# Patient Record
Sex: FEMALE | Race: WHITE | NOT HISPANIC OR LATINO | Employment: FULL TIME | URBAN - METROPOLITAN AREA
[De-identification: names, ages, dates, MRNs, and addresses within clinical notes are randomized per-mention and may not be internally consistent; named-entity substitution may affect disease eponyms.]

---
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
## 2019-01-10 NOTE — PROGRESS NOTES
"   SUBJECTIVE:   CC: Mariah Arboleda is an 18 year old woman who presents for preventive health visit.     Would like to renew her ocp    Physical   Annual:     Getting at least 3 servings of Calcium per day:  Yes    Bi-annual eye exam:  Yes    Dental care twice a year:  Yes    Sleep apnea or symptoms of sleep apnea:  None    Diet:  Regular (no restrictions)    Frequency of exercise:  2-3 days/week    Duration of exercise:  15-30 minutes    Taking medications regularly:  Yes    Medication side effects:  Not applicable    Additional concerns today:  Yes    PHQ-2 Total Score: 1    Any CONCERNS:   # history of cramping, heavy periods -> really happy on ocps  - moods have improved  - things have been really good on the ocps    # mental health  - going \"pretty good\"  - end of the semester was stressful with finals and trying to figure out housing    # exercise  - started going to the gym on campus - elliptical, weights.    Today's PHQ-2 Score:   PHQ-2 ( 1999 [employer]) 1/16/2019   Q1: Little interest or pleasure in doing things 1   Q2: Feeling down, depressed or hopeless 0   PHQ-2 Score 1   Q1: Little interest or pleasure in doing things Several days   Q2: Feeling down, depressed or hopeless Not at all   PHQ-2 Score 1     Abuse: Current or Past(Physical, Sexual or Emotional)- No  Do you feel safe in your environment? Yes    Social History     Tobacco Use     Smoking status: Never Smoker     Smokeless tobacco: Never Used   Substance Use Topics     Alcohol use: No     Alcohol Use 1/16/2019   If you drink alcohol do you typically have greater than 3 drinks per day OR greater than 7 drinks per week? Not Applicable     Reviewed orders with patient.  Reviewed health maintenance and updated orders accordingly - Yes  Patient Active Problem List   Diagnosis     Overweight (BMI 25.0-29.9)     Anxiety     Past Surgical History:   Procedure Laterality Date     HC TOOTH EXTRACTION W/FORCEP       PE TUBES         Social History "     Tobacco Use     Smoking status: Never Smoker     Smokeless tobacco: Never Used   Substance Use Topics     Alcohol use: No     Family History   Problem Relation Age of Onset     Melanoma Mother      Cancer Father         testicular      Lung Cancer Paternal Grandmother      Leukemia Maternal Aunt         in her 40s     Diabetes Maternal Aunt      Cancer Paternal Aunt         mouth      Leukemia Paternal Aunt         College age     Heart Disease Paternal Grandfather      Macular Degeneration Paternal Grandfather      Glaucoma Maternal Grandmother      Brain Tumor Maternal Grandfather      Breast Cancer No family hx of      Colon Cancer No family hx of      Retinal detachment No family hx of          Current Outpatient Medications   Medication Sig Dispense Refill     norgestim-eth estrad triphasic (ORTHO TRI-CYCLEN LO) 0.18/0.215/0.25 MG-25 MCG tablet Take 1 tablet by mouth daily 84 tablet 3     Allergies   Allergen Reactions     Nystatin Nausea and Vomiting       Mammogram not appropriate for this patient based on age.    Pertinent mammograms are reviewed under the imaging tab.  History of abnormal Pap smear: NO - under age 21, PAP not appropriate for age     Reviewed and updated as needed this visit by clinical staff  Tobacco  Allergies  Meds  Med Hx  Surg Hx  Fam Hx  Soc Hx        Reviewed and updated as needed this visit by Provider  Med Hx  Surg Hx  Fam Hx        Past Medical History:   Diagnosis Date     Pertussis 2016      Past Surgical History:   Procedure Laterality Date     HC TOOTH EXTRACTION W/FORCEP       PE TUBES         Review of Systems   Constitutional: Negative for chills and fever.   HENT: Negative for congestion, ear pain, hearing loss and sore throat.    Eyes: Negative for pain and visual disturbance.   Respiratory: Negative for cough and shortness of breath.    Cardiovascular: Negative for chest pain, palpitations and peripheral edema.   Gastrointestinal: Negative for abdominal pain,  "constipation, diarrhea, heartburn, hematochezia and nausea.   Breasts:  Negative for tenderness, breast mass and discharge.   Genitourinary: Negative for dysuria, frequency, genital sores, hematuria, pelvic pain, urgency, vaginal bleeding and vaginal discharge.   Musculoskeletal: Negative for arthralgias, joint swelling and myalgias.   Skin: Negative for rash.   Neurological: Negative for dizziness, weakness, headaches and paresthesias.   Psychiatric/Behavioral: Negative for mood changes. The patient is nervous/anxious.         OBJECTIVE:   /68   Pulse 77   Temp 98.1  F (36.7  C) (Tympanic)   Ht 1.676 m (5' 6\")   Wt 82.9 kg (182 lb 11.2 oz)   SpO2 98%   BMI 29.49 kg/m    Physical Exam  GENERAL: healthy, alert and no distress  EYES: Eyes grossly normal to inspection, PERRL and conjunctivae and sclerae normal  HENT: ear canals and TM's normal, nose and mouth without ulcers or lesions  NECK: no adenopathy, no asymmetry, masses, or scars and thyroid normal to palpation  RESP: lungs clear to auscultation - no rales, rhonchi or wheezes  CV: regular rate and rhythm, normal S1 S2, no S3 or S4, no murmur, click or rub, no peripheral edema and peripheral pulses strong  ABDOMEN: soft, nontender, no hepatosplenomegaly, no masses and bowel sounds normal  MS: no gross musculoskeletal defects noted, no edema  SKIN: no suspicious lesions or rashes  NEURO: Normal strength and tone, mentation intact and speech normal  PSYCH: mentation appears normal, affect normal/bright    Diagnostic Test Results:  none     ASSESSMENT/PLAN:       ICD-10-CM    1. Routine general medical examination at a health care facility Z00.00    2. Overweight (BMI 25.0-29.9) E66.3    3. Encounter for surveillance of contraceptive pills Z30.41 norgestim-eth estrad triphasic (ORTHO TRI-CYCLEN LO) 0.18/0.215/0.25 MG-25 MCG tablet       COUNSELING:  Reviewed preventive health counseling, as reflected in patient instructions       Regular exercise       " "Healthy diet/nutrition       Contraception       Safe sex practices/STD prevention    BP Readings from Last 1 Encounters:   01/16/19 122/68 (82 %/ 58 %)*     *BP percentiles are based on the August 2017 AAP Clinical Practice Guideline for girls     Estimated body mass index is 29.49 kg/m  as calculated from the following:    Height as of this encounter: 1.676 m (5' 6\").    Weight as of this encounter: 82.9 kg (182 lb 11.2 oz).    BP Screening:   Last 3 BP Readings:    BP Readings from Last 3 Encounters:   01/16/19 122/68 (82 %/ 58 %)*   08/06/18 118/68 (72 %/ 57 %)*   01/07/16 112/70 (58 %/ 63 %)*     *BP percentiles are based on the August 2017 AAP Clinical Practice Guideline for girls     The following was recommended to the patient:  Re-screen BP within a year and recommended lifestyle modifications  Weight management plan: Discussed healthy diet and exercise guidelines     reports that  has never smoked. she has never used smokeless tobacco.    Counseling Resources:  ATP IV Guidelines  Pooled Cohorts Equation Calculator  Breast Cancer Risk Calculator  FRAX Risk Assessment  ICSI Preventive Guidelines  Dietary Guidelines for Americans, 2010  [employer] MyPlate  ASA Prophylaxis  Lung CA Screening    Patricio Copeland MD  [facility]  "

## 2019-01-10 NOTE — PATIENT INSTRUCTIONS
It was nice to see you in clinic.    I'm so glad you talked about mental health. I do recommend looking into therapy at school - let me know if things aren't going well.     Schedule exercise 3-4 times a week.   I love the yoga class.    The [facility] Crisis Response Unit (CRU) provides 24-hour phone and face-to-face crisis intervention and consultation. Call [phone].   Other crisis resources:   Crisis Connection (24-hour hotline for mental health): 983.272.8164   [facility] Hotline for Youth Crisis and Suicide: 5-620-XSIOZPO (1-325.409.9820)    Preventive Health Recommendations  Female Ages 18 to 20     Yearly exam:     See your health care provider every year in order to  o Review health changes.   o Discuss preventive care.    o Review your medicines if your doctor has prescribed any.      You should be tested each year for STDs (sexually transmitted diseases).       After age 20, talk to your provider about how often you should have cholesterol testing.      If you are at risk for diabetes, you should have a diabetes test (fasting glucose).     Shots:     Get a flu shot each year.     Get a tetanus shot every 10 years.     Consider getting the shot (vaccine) that prevents cervical cancer (Gardasil).    Nutrition:     Eat at least 5 servings of fruits and vegetables each day.    Eat whole-grain bread, whole-wheat pasta and brown rice instead of white grains and rice.    Get adequate Calcium and Vitamin D.     Lifestyle    Exercise at least 150 minutes a week each week (30 minutes a day, 5 days a week). This will help you control your weight and prevent disease.    No smoking.     Wear sunscreen to prevent skin cancer.    See your dentist every six months for an exam and cleaning.

## 2019-01-16 ENCOUNTER — OFFICE VISIT (OUTPATIENT)
Dept: PEDIATRICS | Facility: CLINIC | Age: 19
End: 2019-01-16
Payer: COMMERCIAL

## 2019-01-16 VITALS
BODY MASS INDEX: 29.36 KG/M2 | DIASTOLIC BLOOD PRESSURE: 68 MMHG | TEMPERATURE: 98.1 F | OXYGEN SATURATION: 98 % | WEIGHT: 182.7 LBS | HEIGHT: 66 IN | SYSTOLIC BLOOD PRESSURE: 122 MMHG | HEART RATE: 77 BPM

## 2019-01-16 DIAGNOSIS — E66.3 OVERWEIGHT (BMI 25.0-29.9): ICD-10-CM

## 2019-01-16 DIAGNOSIS — Z30.41 ENCOUNTER FOR SURVEILLANCE OF CONTRACEPTIVE PILLS: ICD-10-CM

## 2019-01-16 DIAGNOSIS — Z00.00 ROUTINE GENERAL MEDICAL EXAMINATION AT A HEALTH CARE FACILITY: Primary | ICD-10-CM

## 2019-01-16 PROCEDURE — 99395 PREV VISIT EST AGE 18-39: CPT | Performed by: INTERNAL MEDICINE

## 2019-01-16 RX ORDER — NORGESTIMATE AND ETHINYL ESTRADIOL 7DAYSX3 LO
1 KIT ORAL DAILY
Qty: 84 TABLET | Refills: 3 | Status: SHIPPED | OUTPATIENT
Start: 2019-01-16 | End: 2019-10-01

## 2019-01-16 ASSESSMENT — ENCOUNTER SYMPTOMS
EYE PAIN: 0
FREQUENCY: 0
NAUSEA: 0
WEAKNESS: 0
PALPITATIONS: 0
CHILLS: 0
SHORTNESS OF BREATH: 0
COUGH: 0
BREAST MASS: 0
FEVER: 0
SORE THROAT: 0
MYALGIAS: 0
CONSTIPATION: 0
JOINT SWELLING: 0
DIZZINESS: 0
HEMATOCHEZIA: 0
HEARTBURN: 0
HEMATURIA: 0
NERVOUS/ANXIOUS: 1
DIARRHEA: 0
PARESTHESIAS: 0
DYSURIA: 0
ARTHRALGIAS: 0
HEADACHES: 0
ABDOMINAL PAIN: 0

## 2019-01-16 ASSESSMENT — MIFFLIN-ST. JEOR: SCORE: 1620.47

## 2019-09-30 ENCOUNTER — MYC MEDICAL ADVICE (OUTPATIENT)
Dept: PEDIATRICS | Facility: CLINIC | Age: 19
End: 2019-09-30

## 2019-09-30 DIAGNOSIS — Z30.41 ENCOUNTER FOR SURVEILLANCE OF CONTRACEPTIVE PILLS: ICD-10-CM

## 2019-10-01 RX ORDER — NORGESTIMATE AND ETHINYL ESTRADIOL 7DAYSX3 LO
1 KIT ORAL DAILY
Qty: 84 TABLET | Refills: 0 | Status: SHIPPED | OUTPATIENT
Start: 2019-10-01 | End: 2019-12-09

## 2019-11-06 ENCOUNTER — HEALTH MAINTENANCE LETTER (OUTPATIENT)
Age: 19
End: 2019-11-06

## 2019-11-25 ENCOUNTER — MYC MEDICAL ADVICE (OUTPATIENT)
Dept: PEDIATRICS | Facility: CLINIC | Age: 19
End: 2019-11-25

## 2019-11-25 ENCOUNTER — TELEPHONE (OUTPATIENT)
Dept: PEDIATRICS | Facility: CLINIC | Age: 19
End: 2019-11-25

## 2019-11-25 NOTE — TELEPHONE ENCOUNTER
Reason for call:  Other   Patient called regarding (reason for call): Patient has concerns/questions  Additional comments: Patient's mom called in trying to help Mariah as she is busy with her school schedule. Airam (mom) states Mariah sent a message through View3 earlier today asking for a phone call and no one called. I advised Airam that the message was sent directly to Dr Felicitas Ríos who is out on leave, so she may not see it, however I will send a message to team asking for someone to reach out to the patient via View3 if possible to help answer her questions.  Also mom and daughter will fill out consent to comm and auth Wirescan access for future.  Please communicate with Mariah via Wirescan, or if unable, please call her to discuss and advise.   Thanks.     Phone number to reach patient:  Cell number on file:    Telephone Information:   Mobile [phone]       Best Time:  Any    Can we leave a detailed message on this number?  YES

## 2019-11-26 NOTE — TELEPHONE ENCOUNTER
I called patient left detailed message,  Per Claudia Plascencia to use [facility]  For January 17th  Physical + Birth control insertion.     Requested for patient to return call to station B.        //Tejal Marcano MA// November 26, 2019 9:28 AM

## 2019-11-26 NOTE — TELEPHONE ENCOUNTER
The pt called back and she still wants to keep her physical with her pcp on 3/9/2020. She just wants to see Marylin for bc insertion.   Armida May on 11/26/2019 at 10:29 AM

## 2020-01-17 ENCOUNTER — OFFICE VISIT (OUTPATIENT)
Dept: PEDIATRICS | Facility: CLINIC | Age: 20
End: 2020-01-17
Payer: COMMERCIAL

## 2020-01-17 VITALS
HEIGHT: 66 IN | TEMPERATURE: 98.7 F | RESPIRATION RATE: 16 BRPM | SYSTOLIC BLOOD PRESSURE: 140 MMHG | WEIGHT: 193 LBS | HEART RATE: 109 BPM | DIASTOLIC BLOOD PRESSURE: 78 MMHG | BODY MASS INDEX: 31.02 KG/M2 | OXYGEN SATURATION: 98 %

## 2020-01-17 DIAGNOSIS — Z30.430 ENCOUNTER FOR IUD INSERTION: Primary | ICD-10-CM

## 2020-01-17 DIAGNOSIS — Z30.09 ENCOUNTER FOR OTHER GENERAL COUNSELING OR ADVICE ON CONTRACEPTION: ICD-10-CM

## 2020-01-17 LAB — HCG UR QL: NEGATIVE

## 2020-01-17 PROCEDURE — 87591 N.GONORRHOEAE DNA AMP PROB: CPT | Performed by: NURSE PRACTITIONER

## 2020-01-17 PROCEDURE — 99203 OFFICE O/P NEW LOW 30 MIN: CPT | Mod: 25 | Performed by: NURSE PRACTITIONER

## 2020-01-17 PROCEDURE — 58300 INSERT INTRAUTERINE DEVICE: CPT | Performed by: NURSE PRACTITIONER

## 2020-01-17 PROCEDURE — 81025 URINE PREGNANCY TEST: CPT | Performed by: NURSE PRACTITIONER

## 2020-01-17 PROCEDURE — 87491 CHLMYD TRACH DNA AMP PROBE: CPT | Performed by: NURSE PRACTITIONER

## 2020-01-17 ASSESSMENT — MIFFLIN-ST. JEOR: SCORE: 1670.06

## 2020-01-17 NOTE — PROGRESS NOTES
Chief Complaint/ History of Present Illness:Mariah Arboleda is a 19 year old female.   Patient's last menstrual period was 01/09/2020 (exact date)..    She was here initially to have a nexplanon placed, but she was unsure of her option. She has been on birth contorl pills. She is sexually active.     A pregnancy test was done today.  She is currently using OCP for birth control.   A chlamydia/gonorrhea test was was done today because of never had it before  She is here for an IUD insertion.  Patient has been given written information.  I have reviewed the risks of the IUD including pregnancy, PID, life threatening infection, perforation, expulsion, cramping, changes in bleeding and ovarian cysts. Benefits of the IUD and alternative family planning methods have been discussed.  Patients questions are answered.  Patient has verbalized understanding of risks and benefits and has signed the consent form.    Allergies   Allergen Reactions     Nystatin Nausea and Vomiting     Current Outpatient Medications   Medication Sig Dispense Refill     norgestim-eth estrad triphasic (ORTHO TRI-CYCLEN LO) 0.18/0.215/0.25 MG-25 MCG tablet Take 1 tablet by mouth daily 84 tablet 0      Past Medical History:   Diagnosis Date     Pertussis 2016     Past Surgical History:   Procedure Laterality Date     HC TOOTH EXTRACTION W/FORCEP       PE TUBES         REVIEW OF SYSTEMS  CONSTITUTIONAL: Denies fever, chills and night sweats  BREASTS:  Denies discharge and lumps.    HEART/LUNGS: Denies dyspnea, wheezing, coughing and chest pain.  HEMATOLOGIC: Denies tendency to bruise and history of blood clots.  GENITOURINARY:  Denies urgency, dysuria and hematuria.  NEUROLOGIC:  Denies seizures, weakness and fainting.  PSYCHIATRIC: Negative for changes in mood or affect      EXAM:  BP (!) 140/78 (BP Location: Right arm, Patient Position: Sitting, Cuff Size: Adult Regular)   Pulse 109   Temp 98.7  F (37.1  C) (Tympanic)   Resp 16   Ht 1.689 m (5'  "6.5\")   Wt 87.5 kg (193 lb)   LMP 01/09/2020 (Exact Date)   SpO2 98%   Breastfeeding No   BMI 30.69 kg/m      Abdomen: soft, nontender, without hepatosplenomegaly or masses  : normal cervix, adnexae, and uterus without masses or discharge  IUD type: Mirena  Lot # [id number] Exp: 04/2022    Procedure:  Uterus assessed for position and is Anteverted.  Speculum inserted.  Betadine prep of cervix done.  Tenaculum used. Uterus sounded to 9cm's.  IUD inserted in the usual fashion without problems, ie: resistance, severe protracted pain or excessive bleeding.  Strings trimmed to 2 cm's.  Patient tolerated the procedure  well without any prolonged pain or syncopy.      ASSESSMENT / PLAN:  (Z30.430) Encounter for IUD insertion  (primary encounter diagnosis)  Comment:   Plan:     (Z30.09) Encounter for other general counseling or advice on contraception  Comment: we had a veyr lenghty conversation about birth control options including OCP, nuvaring, depo, nexplanon, IUD. We discussed the benefits and risks as well as side effects extensively. She was going to do nexplanon, and at last minute changed her mind to IUD. We spent additional time ensuring she felt comfortable with this choice. Of note she is going back to college tomorrow and would like to intiiate the method today. She did sign consent and reviewed placement.   Plan:   30 minutes spent in face-to-face time with patient and >50% of time in counseling and education about contraception, above and beyond preventative care.           Patient Instructions   You may experience a period or spotting that will last over the next week. This usually starts to lighten after a few days. It takes about a month for this spotting is usually gone, unless you are one of the few that experience spotting that can last up to 3-6 months. You may experience cramping for the remainder of the day and continue to take ibuprofen 2-3 tabs every six hours as needed. This will likely " subside over the next day.      Please contact me by mychart of phone if you should have any unusual symptoms or concerns in the mean time.                 Instructions given to patient regarding checking IUD strings, returning to the clinic if pain or inability to check strings and/or irregular bleeding.

## 2020-01-17 NOTE — PATIENT INSTRUCTIONS
You may experience a period or spotting that will last over the next week. This usually starts to lighten after a few days. It takes about a month for this spotting is usually gone, unless you are one of the few that experience spotting that can last up to 3-6 months. You may experience cramping for the remainder of the day and continue to take ibuprofen 2-3 tabs every six hours as needed. This will likely subside over the next day.      Please contact me by mychart of phone if you should have any unusual symptoms or concerns in the mean time.

## 2020-01-19 LAB
C TRACH DNA SPEC QL NAA+PROBE: NEGATIVE
N GONORRHOEA DNA SPEC QL NAA+PROBE: NEGATIVE
SPECIMEN SOURCE: NORMAL
SPECIMEN SOURCE: NORMAL

## 2020-02-15 NOTE — PROGRESS NOTES
SUBJECTIVE:   Mariah Arboleda is a 17 year old female who presents to clinic today for the following health issues:    New Patient/Transfer of Care  Patient is here for consultation of birth control    -----------  Menarche in 5th grade - 9 or 10  Regular  Some are heavy - on heaviest day uses 3-4 tampons/pads  Gets a lot of cramping - first 3 days are really bad - takes pamprin (similar to midol) - heating pad... Doesn't miss school  No fh of bleeding disorders    ? Pat g'pa blood clots  No immediate family history of DVT/PE.  No history of migraine or migraine with aura.    Would like to start birth control to decrease cramps and PMS symptoms.    Never sexually active. Not planning on sexual activity.    Previously followed by Pediatrician - Mom thinks she may be due for vaccinations but not sure. Has been some time since her last visit.     With Mom out of the room Mariah has no concerns - not planning on sex. Never sexually active.     Problem list and histories reviewed & adjusted, as indicated.  Additional history: as documented    There is no problem list on file for this patient.    Past Surgical History:   Procedure Laterality Date     PE TUBES         Social History   Substance Use Topics     Smoking status: Never Smoker     Smokeless tobacco: Never Used     Alcohol use No     Family History   Problem Relation Age of Onset     Melanoma Mother      Cancer Father      testicular      Lung Cancer Paternal Grandmother      Leukemia Maternal Aunt      in her 40s     Diabetes Maternal Aunt      Cancer Paternal Aunt      mouth      Leukemia Paternal Aunt      College age     HEART DISEASE Paternal Grandfather      Breast Cancer No family hx of      Colon Cancer No family hx of          Current Outpatient Prescriptions   Medication Sig Dispense Refill     norgestim-eth estrad triphasic (ORTHO TRI-CYCLEN LO) 0.18/0.215/0.25 MG-25 MCG per tablet Take 1 tablet by mouth daily 84 tablet 1     Allergies   Allergen  "Reactions     Nystatin Nausea and Vomiting       Reviewed and updated as needed this visit by clinical staff       Reviewed and updated as needed this visit by Provider         ROS:  Constitutional, HEENT, cardiovascular, pulmonary, gi and gu systems are negative, except as otherwise noted.    OBJECTIVE:     /68  Pulse 71  Temp 98.3  F (36.8  C) (Oral)  Ht 5' 6.26\" (1.683 m)  Wt 183 lb 9.6 oz (83.3 kg)  LMP 07/18/2018 (Exact Date)  SpO2 98%  Breastfeeding? No  BMI 29.4 kg/m2  Body mass index is 29.4 kg/(m^2).  GENERAL: healthy, alert and no distress  RESP: lungs clear to auscultation - no rales, rhonchi or wheezes  CV: regular rate and rhythm, normal S1 S2, no S3 or S4, no murmur, click or rub, no peripheral edema and peripheral pulses strong  ABDOMEN: soft, nontender, no hepatosplenomegaly, no masses and bowel sounds normal  MS: no gross musculoskeletal defects noted, no edema  SKIN: no suspicious lesions or rashes  NEURO: Normal strength and tone, mentation intact and speech normal  PSYCH: mentation appears normal, affect normal/bright    Diagnostic Test Results:  none     ASSESSMENT/PLAN:       ICD-10-CM    1. Encounter for initial prescription of contraceptive pills Z30.011 norgestim-eth estrad triphasic (ORTHO TRI-CYCLEN LO) 0.18/0.215/0.25 MG-25 MCG per tablet   2. Moderate cramps with menses N94.6 norgestim-eth estrad triphasic (ORTHO TRI-CYCLEN LO) 0.18/0.215/0.25 MG-25 MCG per tablet     Would like to start birth control to regulate cramping and PMS symptoms. Reviewed different options - pt elects OCPs. No contraindications. Non smoker. No personal or FH of DVT/PE, no personal history of migraine with aura.     Never sexually active - no pregnancy test needed.    Discussed at length risk factors of birth control including blood clots and hypertension. Advised to go to ED if ever develops leg swelling or shortness of breath. Side effects including headaches, nausea, and mood changes were " discussed. Reviewed that birth control dose NOT protect again STDs, recommend routine screening with any new partner or symptoms. Discussed that concurrent smoking will further raise risk of blood clots while on contraception.    Will get records from Pediatrician and plan for vaccines prior to college. Return to clinic in December for Wellness and check in on OCPs.     Patient Instructions   Nice to meet you today!    Let's try ortho tri cyclen low. Can have spotting/irregular periods for the first few months.     We'll get records and be in touch about vaccines. Call us if you haven't heard anything in 2 weeks.     Let's plan for a Wellness visit over [place] break - we can also touch base about the birth control.    Any other questions [email or web address] is a great resource.    Combination Pills (COCs)    You can start the combination pill at any time.    First Day Start - Take your first pill during the first 24 hours of your menstrual cycle. No back-up contraceptive method is needed when the pill is started the first day of your menses.    Sunday Start - Wait until the first Sunday after your menstrual cycle begins to take your first pill. With this option use another method of birth control for the first 7 days of the first cycle only. The benefit of this is that you shouldn't have a period on the weekend.    Today Start - Start the pill today. If you have had unprotected sexual intercourse since your last period, perform a pregnancy test prior to starting the pill. If it is negative, start the pill today. Use another method of birth control such as condoms or spermicide the first seven days of the first cycle of use.    Irregular vaginal bleeding or spotting may occur while you are taking the pills, especially during the first few months of oral contraceptive use. If you experience spotting or break-through bleeding, (bleeding that occurs outside of the placebo week) that occurs after the first 3 cycles, or  lasts for more than a few days, please let me know.    Patricio Copeland MD  [facility]   Observation Stay One Calendar Day

## 2020-03-09 ENCOUNTER — OFFICE VISIT (OUTPATIENT)
Dept: PEDIATRICS | Facility: CLINIC | Age: 20
End: 2020-03-09
Payer: COMMERCIAL

## 2020-03-09 VITALS
HEART RATE: 83 BPM | RESPIRATION RATE: 16 BRPM | SYSTOLIC BLOOD PRESSURE: 120 MMHG | OXYGEN SATURATION: 99 % | TEMPERATURE: 98.2 F | BODY MASS INDEX: 31.56 KG/M2 | HEIGHT: 67 IN | WEIGHT: 201.1 LBS | DIASTOLIC BLOOD PRESSURE: 77 MMHG

## 2020-03-09 DIAGNOSIS — Z11.3 SCREEN FOR STD (SEXUALLY TRANSMITTED DISEASE): ICD-10-CM

## 2020-03-09 DIAGNOSIS — Z00.00 ROUTINE GENERAL MEDICAL EXAMINATION AT A HEALTH CARE FACILITY: Primary | ICD-10-CM

## 2020-03-09 PROCEDURE — 99395 PREV VISIT EST AGE 18-39: CPT | Performed by: NURSE PRACTITIONER

## 2020-03-09 PROCEDURE — 80061 LIPID PANEL: CPT | Performed by: NURSE PRACTITIONER

## 2020-03-09 PROCEDURE — 36415 COLL VENOUS BLD VENIPUNCTURE: CPT | Performed by: NURSE PRACTITIONER

## 2020-03-09 PROCEDURE — 87389 HIV-1 AG W/HIV-1&-2 AB AG IA: CPT | Performed by: NURSE PRACTITIONER

## 2020-03-09 ASSESSMENT — ENCOUNTER SYMPTOMS
BREAST MASS: 0
WEAKNESS: 0
COUGH: 0
PARESTHESIAS: 0
ABDOMINAL PAIN: 0
DIZZINESS: 0
NAUSEA: 0
HEMATOCHEZIA: 0
FEVER: 0
PALPITATIONS: 0
ARTHRALGIAS: 0
NERVOUS/ANXIOUS: 1
DIARRHEA: 0
SHORTNESS OF BREATH: 0
MYALGIAS: 0
SORE THROAT: 0
FREQUENCY: 0
HEADACHES: 0
EYE PAIN: 0
DYSURIA: 0
HEMATURIA: 0
CHILLS: 0
HEARTBURN: 0
JOINT SWELLING: 0
CONSTIPATION: 0

## 2020-03-09 ASSESSMENT — MIFFLIN-ST. JEOR: SCORE: 1706.87

## 2020-03-09 ASSESSMENT — ANXIETY QUESTIONNAIRES
IF YOU CHECKED OFF ANY PROBLEMS ON THIS QUESTIONNAIRE, HOW DIFFICULT HAVE THESE PROBLEMS MADE IT FOR YOU TO DO YOUR WORK, TAKE CARE OF THINGS AT HOME, OR GET ALONG WITH OTHER PEOPLE: SOMEWHAT DIFFICULT
GAD7 TOTAL SCORE: 8
3. WORRYING TOO MUCH ABOUT DIFFERENT THINGS: SEVERAL DAYS
5. BEING SO RESTLESS THAT IT IS HARD TO SIT STILL: MORE THAN HALF THE DAYS
7. FEELING AFRAID AS IF SOMETHING AWFUL MIGHT HAPPEN: NOT AT ALL
1. FEELING NERVOUS, ANXIOUS, OR ON EDGE: SEVERAL DAYS
2. NOT BEING ABLE TO STOP OR CONTROL WORRYING: SEVERAL DAYS
6. BECOMING EASILY ANNOYED OR IRRITABLE: MORE THAN HALF THE DAYS

## 2020-03-09 ASSESSMENT — PATIENT HEALTH QUESTIONNAIRE - PHQ9
5. POOR APPETITE OR OVEREATING: SEVERAL DAYS
SUM OF ALL RESPONSES TO PHQ QUESTIONS 1-9: 5

## 2020-03-09 NOTE — PROGRESS NOTES
SUBJECTIVE:   CC: Mariah Arboleda is an 19 year old woman who presents for preventive health visit.     Healthy Habits:     Getting at least 3 servings of Calcium per day:  Yes    Bi-annual eye exam:  Yes    Dental care twice a year:  Yes    Sleep apnea or symptoms of sleep apnea:  None    Diet:  Regular (no restrictions)    Frequency of exercise:  4-5 days/week    Duration of exercise:  45-60 minutes    Taking medications regularly:  Not Applicable    Barriers to taking medications:  Not applicable    Medication side effects:  Not applicable    PHQ-2 Total Score: 1    Additional concerns today:  No    Has IUD in place, can now feel strings. Had spotting first month, then had menses, then hasn't bled since. No new partners sincae last sti testing.     Today's PHQ-2 Score:   PHQ-2 ( 1999 Pfizer) 3/9/2020   Q1: Little interest or pleasure in doing things 0   Q2: Feeling down, depressed or hopeless 1   PHQ-2 Score 1   Q1: Little interest or pleasure in doing things Not at all   Q2: Feeling down, depressed or hopeless Several days   PHQ-2 Score 1     Started exercising more, cardio with eliptical, weight lifting for 5 days per wk.    Wt Readings from Last 4 Encounters:   03/09/20 91.2 kg (201 lb 1.6 oz)   01/17/20 87.5 kg (193 lb)   01/16/19 82.9 kg (182 lb 11.2 oz) (95 %)*   08/06/18 83.3 kg (183 lb 9.6 oz) (96 %)*     * Growth percentiles are based on CDC (Girls, 2-20 Years) data.       Abuse: Current or Past(Physical, Sexual or Emotional)- No  Do you feel safe in your environment? Yes        Social History     Tobacco Use     Smoking status: Never Smoker     Smokeless tobacco: Never Used   Substance Use Topics     Alcohol use: No         Alcohol Use 3/9/2020   Prescreen: >3 drinks/day or >7 drinks/week? No       Reviewed orders with patient.  Reviewed health maintenance and updated orders accordingly - Yes  Lab work is in process    Mammogram not appropriate for this patient based on age.    Pertinent mammograms  "are reviewed under the imaging tab.  History of abnormal Pap smear: NO - under age 21, PAP not appropriate for age     Reviewed and updated as needed this visit by clinical staff  Tobacco  Allergies  Meds  Med Hx  Surg Hx  Fam Hx  Soc Hx        Reviewed and updated as needed this visit by Provider  Meds            Review of Systems   Constitutional: Negative for chills and fever.   HENT: Negative for congestion, ear pain, hearing loss and sore throat.    Eyes: Negative for pain and visual disturbance.   Respiratory: Negative for cough and shortness of breath.    Cardiovascular: Negative for chest pain, palpitations and peripheral edema.   Gastrointestinal: Negative for abdominal pain, constipation, diarrhea, heartburn, hematochezia and nausea.   Breasts:  Negative for tenderness, breast mass and discharge.   Genitourinary: Negative for dysuria, frequency, genital sores, hematuria, pelvic pain, urgency, vaginal bleeding and vaginal discharge.   Musculoskeletal: Negative for arthralgias, joint swelling and myalgias.   Skin: Negative for rash.   Neurological: Negative for dizziness, weakness, headaches and paresthesias.   Psychiatric/Behavioral: Positive for mood changes. The patient is nervous/anxious.           OBJECTIVE:   /77   Pulse 83   Temp 98.2  F (36.8  C) (Oral)   Resp 16   Ht 1.689 m (5' 6.5\")   Wt 91.2 kg (201 lb 1.6 oz)   SpO2 99%   BMI 31.97 kg/m    Physical Exam  GENERAL: healthy, alert and no distress  EYES: Eyes grossly normal to inspection, PERRL and conjunctivae and sclerae normal  HENT: ear canals and TM's normal, nose and mouth without ulcers or lesions  NECK: no adenopathy, no asymmetry, masses, or scars and thyroid normal to palpation  RESP: lungs clear to auscultation - no rales, rhonchi or wheezes  CV: regular rate and rhythm, normal S1 S2, no S3 or S4, no murmur, click or rub, no peripheral edema and peripheral pulses strong  MS: no gross musculoskeletal defects noted, no " "edema  SKIN: no suspicious lesions or rashes  PSYCH: mentation appears normal, affect normal/bright      ASSESSMENT/PLAN:   1. Routine general medical examination at a health care facility  She couldn't find strings, bu then found them and has had no probvlem and likes her IUD.   - Lipid Profile    2. Screen for STD (sexually transmitted disease)    - HIV Antigen Antibody Combo    COUNSELING:  Reviewed preventive health counseling, as reflected in patient instructions  Special attention given to:        Regular exercise       Healthy diet/nutrition       Contraception       Family planning       Folic Acid Counseling       Osteoporosis Prevention/Bone Health       Safe sex practices/STD prevention    Estimated body mass index is 31.97 kg/m  as calculated from the following:    Height as of this encounter: 1.689 m (5' 6.5\").    Weight as of this encounter: 91.2 kg (201 lb 1.6 oz).         reports that she has never smoked. She has never used smokeless tobacco.      Counseling Resources:  ATP IV Guidelines  Pooled Cohorts Equation Calculator  Breast Cancer Risk Calculator  FRAX Risk Assessment  ICSI Preventive Guidelines  Dietary Guidelines for Americans, 2010  USDA's MyPlate  ASA Prophylaxis  Lung CA Screening    DELLA Armenadriz [facility]  "

## 2020-03-10 LAB
CHOLEST SERPL-MCNC: 190 MG/DL
HDLC SERPL-MCNC: 53 MG/DL
HIV 1+2 AB+HIV1 P24 AG SERPL QL IA: NONREACTIVE
LDLC SERPL CALC-MCNC: 124 MG/DL
NONHDLC SERPL-MCNC: 137 MG/DL
TRIGL SERPL-MCNC: 65 MG/DL

## 2020-03-10 ASSESSMENT — ANXIETY QUESTIONNAIRES: GAD7 TOTAL SCORE: 8

## 2020-11-29 ENCOUNTER — HEALTH MAINTENANCE LETTER (OUTPATIENT)
Age: 20
End: 2020-11-29

## 2021-01-04 ENCOUNTER — VIRTUAL VISIT (OUTPATIENT)
Dept: PEDIATRICS | Facility: CLINIC | Age: 21
End: 2021-01-04
Payer: COMMERCIAL

## 2021-01-04 DIAGNOSIS — F41.9 ANXIETY: Primary | ICD-10-CM

## 2021-01-04 DIAGNOSIS — F32.0 MILD MAJOR DEPRESSION (H): ICD-10-CM

## 2021-01-04 PROCEDURE — 99213 OFFICE O/P EST LOW 20 MIN: CPT | Mod: 95 | Performed by: PEDIATRICS

## 2021-01-04 PROCEDURE — 96127 BRIEF EMOTIONAL/BEHAV ASSMT: CPT | Performed by: PEDIATRICS

## 2021-01-04 RX ORDER — MULTIVIT-MIN/IRON/FOLIC ACID/K 18-600-40
1000 CAPSULE ORAL EVERY OTHER DAY
COMMUNITY

## 2021-01-04 RX ORDER — ESCITALOPRAM OXALATE 10 MG/1
TABLET ORAL
COMMUNITY
Start: 2020-11-25 | End: 2021-04-05

## 2021-01-04 ASSESSMENT — ANXIETY QUESTIONNAIRES
2. NOT BEING ABLE TO STOP OR CONTROL WORRYING: NEARLY EVERY DAY
7. FEELING AFRAID AS IF SOMETHING AWFUL MIGHT HAPPEN: SEVERAL DAYS
7. FEELING AFRAID AS IF SOMETHING AWFUL MIGHT HAPPEN: SEVERAL DAYS
6. BECOMING EASILY ANNOYED OR IRRITABLE: MORE THAN HALF THE DAYS
4. TROUBLE RELAXING: NEARLY EVERY DAY
1. FEELING NERVOUS, ANXIOUS, OR ON EDGE: NEARLY EVERY DAY
GAD7 TOTAL SCORE: 17
3. WORRYING TOO MUCH ABOUT DIFFERENT THINGS: NEARLY EVERY DAY
GAD7 TOTAL SCORE: 17
5. BEING SO RESTLESS THAT IT IS HARD TO SIT STILL: MORE THAN HALF THE DAYS

## 2021-01-04 NOTE — PATIENT INSTRUCTIONS
Demetrio Lemus,    I'm glad your eating is back on track and you have found a good psychiatrist. At this point I do not believe you need a further workup for an eating disorder.    We will plan to check your vitamin D and thyroid at your upcoming visit.    Take care,    Marina Sotelo MD  Internal Medicine/Pediatrics  [facility]

## 2021-01-04 NOTE — Clinical Note
FYI - patient has physical with you later this spring - we discussed checking vit D and TSH at this time due to mental health - she is following with psychiatry outside our system. Marina BENNETT

## 2021-01-04 NOTE — PROGRESS NOTES
Mariah Arboleda is a 20 year old female who is being evaluated via a billable video visit.      How would you like to obtain your AVS? MyChart  If the video visit is dropped, the invitation should be resent by: Send to e-mail at: [email or web address]  Will anyone else be joining your video visit? No      Video Start Time: 10:09am  Assessment & Plan     Anxiety  Following with psychatiry - doing weekly therapy and just started lexapro 10mg four weeks ago - has appropriate follow up with psych.    Regarding restrictive eating x 1 week - this patient dose not have any history of eating disorders, states she is eating regularly now and thinks the week of restrictive eating was likely due to stress.  She does have an in person physical coming up - at which time we can check weight, thyroid and vit D.  In the mean time she can participate in routine therapies per her mental don program.  She does not need further workup for an eating disorder at this time.    Mild major depression (H)  See above        20 minutes spent on the date of the encounter doing patient visit and documentation              Patient Instructions   Hi Mariah,    I'm glad your eating is back on track and you have found a good psychiatrist. At this point I do not believe you need a further workup for an eating disorder.    We will plan to check your vitamin D and thyroid at your upcoming visit.    Take care,    Marina Sotelo MD  Internal Medicine/Pediatrics  [facility]          Return for Appt already scheduled.    Marina Sotelo MD  [facility]    Subjective     Mariah Arboleda is a 20 year old female who presents to clinic today for the following health issues     History of Present Illness       She eats 2-3 servings of fruits and vegetables daily.She consumes 0 sweetened beverage(s) daily.She exercises with enough effort to increase her heart rate 10 to 19 minutes per day.  She exercises with enough effort to  increase her heart rate 3 or less days per week.   She is taking medications regularly.    Patient presents today for nutrition check-in as requested by therapist.    Week prior to Thanksgiving - difficult eating habits - not eating for a week. Only ate a thanh and goldfish. Right now she feels good. Unsure if there was anxiety due to holidays.     Patient lives with some roommates - currently on winter break - visiting one of her roommates.     Now eating 2-3 meal    Vitamin D  TSH      Anxiety Follow-Up    How are you doing with your anxiety since your last visit? No change - has started therapy    Are you having other symptoms that might be associated with anxiety? Yes:  occasional panic attacks, sometimes difficulty sleeping    Have you had a significant life event? Job Concerns - new job    Are you feeling depressed? Yes:  started med and is helping    Do you have any concerns with your use of alcohol or other drugs? No     Patient just started Lexapro about a month ago - from psychiatry - first medication for anxiety. Dr. Sloane Armstrong with [facility] counseling and wellness. Started therapy as well with Fany Loving. Has a follow up with them in about 2 weeks. Patient feels that it is helping with depression but not so much anxiety. Therapy is weekly - meeting on Wednesday.     Social History     Tobacco Use     Smoking status: Never Smoker     Smokeless tobacco: Never Used   Substance Use Topics     Alcohol use: No     Drug use: No     BRENDA-7 SCORE 3/9/2020 1/4/2021   Total Score - 17 (severe anxiety)   Total Score 8 17     PHQ 3/9/2020   PHQ-9 Total Score 5   Q9: Thoughts of better off dead/self-harm past 2 weeks Not at all             Review of Systems         Objective           Vitals:  No vitals were obtained today due to virtual visit.    Physical Exam   GENERAL: Healthy, alert and no distress  EYES: Eyes grossly normal to inspection.  No discharge or erythema, or obvious scleral/conjunctival  abnormalities.  RESP: No audible wheeze, cough, or visible cyanosis.  No visible retractions or increased work of breathing.    SKIN: Visible skin clear. No significant rash, abnormal pigmentation or lesions.  NEURO: Cranial nerves grossly intact.  Mentation and speech appropriate for age.  PSYCH: Mentation appears normal, affect normal/bright, judgement and insight intact, normal speech and appearance well-groomed.                Video-Visit Details    Type of service:  Video Visit    Video End Time:10:13am    Originating Location (pt. Location): Home    Distant Location (provider location):  [facility]     Platform used for Video Visit: Vitasol  Answers for HPI/ROS submitted by the patient on 1/4/2021   Chronic problems general questions HPI Form  BRENDA 7 TOTAL SCORE: 17

## 2021-01-05 ASSESSMENT — ANXIETY QUESTIONNAIRES: GAD7 TOTAL SCORE: 17

## 2021-04-01 ENCOUNTER — IMMUNIZATION (OUTPATIENT)
Dept: NURSING | Facility: CLINIC | Age: 21
End: 2021-04-01
Payer: COMMERCIAL

## 2021-04-01 PROCEDURE — 91300 PR COVID VAC PFIZER DIL RECON 30 MCG/0.3 ML IM: CPT

## 2021-04-01 PROCEDURE — 0001A PR COVID VAC PFIZER DIL RECON 30 MCG/0.3 ML IM: CPT

## 2021-04-02 NOTE — PROGRESS NOTES
Pre-Visit Planning     Appointment Notes for this encounter:   Nutrition and yearly physical.    Questionnaires Reviewed/Assigned  Additional questionnaires assigned    Patient preferred phone number: [phone]    Unable to reach. Left voicemail. Advised patient to call clinic back at [phone].    Gaby Hamilton, EMT at 2:48 PM on April 2, 2021  Clinic Health Guide   [phone]

## 2021-04-02 NOTE — PROGRESS NOTES
SUBJECTIVE:   CC: Mariah Arboleda is an 20 year old woman who presents for preventive health visit.     Patient has been advised of split billing requirements and indicates understanding: Yes     Healthy Habits:     Getting at least 3 servings of Calcium per day:  NO    Bi-annual eye exam:  NO    Dental care twice a year:  Yes    Sleep apnea or symptoms of sleep apnea:  Daytime drowsiness    Diet:  Regular (no restrictions)    Frequency of exercise:  None    Taking medications regularly:  Yes    Medication side effects:  Not applicable    PHQ-2 Total Score: 2    Additional concerns today:  Yes    Pt reports she had her 1st dose COVID vaccine Thursday April 1, 2021. Pfeizer.    Reports she has been having off and on feeling in chest, heartburn. Notices this randomly, afternoon or evening. Takes tums, they help sometimes.    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     - looking to do more at home workouts in new apt  - nicer weather    # Mental Health - more anxiety than depression  Per 1/4/21 VV with Dr. Sotelo: Patient just started Lexapro about a month ago - from psychiatry - first medication for anxiety. Dr. Sloane Armstrong with [facility] counseling and wellness. Started therapy as well with Fany Loving. Has a follow up with them in about 2 weeks. Patient feels that it is helping with depression but not so much anxiety. Therapy is weekly - meeting on Wednesday.   - changed to sertraline instead of lexapro (didn't do too much for her, very tired)   - feels like things are  better than where she started  - changed her housing a few weeks ago and this has been helpful  - does therapy weekly.   PHQ 3/9/2020 4/4/2021   PHQ-9 Total Score 5 8   Q9: Thoughts of better off dead/self-harm past 2 weeks Not at all Not at all     BRENDA-7 SCORE 3/9/2020 1/4/2021 4/4/2021   Total Score - 17 (severe anxiety) 16 (severe anxiety)   Total Score 8 17 16       Today's PHQ-2 Score:   PHQ-2 ( 1999 Pfizer) 4/4/2021   Q1: Little interest or pleasure in doing things 1   Q2: Feeling down, depressed or hopeless 1   PHQ-2 Score 2   Q1: Little interest or pleasure in doing things Several days   Q2: Feeling down, depressed or hopeless Several days   PHQ-2 Score 2     Abuse: Current or Past (Physical, Sexual or Emotional) - No  Do you feel safe in your environment? Yes    Social History     Tobacco Use     Smoking status: Never Smoker     Smokeless tobacco: Never Used   Substance Use Topics     Alcohol use: No     If you drink alcohol do you typically have >3 drinks per day or >7 drinks per week? No    Alcohol Use 4/5/2021   Prescreen: >3 drinks/day or >7 drinks/week? -   Prescreen: >3 drinks/day or >7 drinks/week? No     Reviewed orders with patient.  Reviewed health maintenance and updated orders accordingly - Yes    Breast Cancer Screening:        History of abnormal Pap smear: NO - age 21-29 PAP every 3 years recommended     Reviewed and updated as needed this visit by clinical staff  Tobacco   Meds     [facility] Hx          Reviewed and updated as needed this visit by Provider    Meds     [facility]             Review of Systems   Constitutional: Negative for chills and fever.   HENT: Negative for congestion, ear pain, hearing loss and sore throat.    Eyes: Negative for pain and visual disturbance.   Respiratory: Negative for cough and shortness of breath.    Cardiovascular: Negative for chest pain, palpitations and peripheral edema.   Gastrointestinal: Positive for heartburn. Negative for abdominal pain,  "constipation, diarrhea, hematochezia and nausea.   Breasts:  Negative for tenderness, breast mass and discharge.   Genitourinary: Negative for dysuria, frequency, genital sores, hematuria, pelvic pain, urgency, vaginal bleeding and vaginal discharge.   Musculoskeletal: Negative for arthralgias, joint swelling and myalgias.   Skin: Negative for rash.   Neurological: Positive for headaches. Negative for dizziness, weakness and paresthesias.   Psychiatric/Behavioral: Positive for mood changes. The patient is nervous/anxious.         OBJECTIVE:   /70 (BP Location: Right arm, Patient Position: Sitting, Cuff Size: Adult Large)   Pulse 106   Temp 98.4  F (36.9  C) (Temporal)   Ht 1.676 m (5' 6\")   Wt 103.4 kg (228 lb)   SpO2 97%   BMI 36.80 kg/m    Physical Exam  GENERAL: healthy, alert and no distress  EYES: Eyes grossly normal to inspection, PERRL and conjunctivae and sclerae normal  HENT: ear canals and TM's normal, nose and mouth without ulcers or lesions  NECK: no adenopathy, no asymmetry, masses, or scars and thyroid normal to palpation  RESP: lungs clear to auscultation - no rales, rhonchi or wheezes  CV: regular rate and rhythm, normal S1 S2, no S3 or S4, no murmur, click or rub, no peripheral edema and peripheral pulses strong  ABDOMEN: soft, nontender, no hepatosplenomegaly, no masses and bowel sounds normal   (female): normal female external genitalia, normal urethral meatus, vaginal mucosa pink, moist, well rugated, and normal cervix/adnexa/uterus without masses or discharge. IUD string visualized.  MS: no gross musculoskeletal defects noted, no edema  SKIN: no suspicious lesions or rashes  NEURO: Normal strength and tone, mentation intact and speech normal  PSYCH: mentation appears normal, affect normal/bright    Diagnostic Test Results:  Labs reviewed in Epic    ASSESSMENT/PLAN:       ICD-10-CM    1. Routine general medical examination at a health care facility  Z00.00    2. Class 2 obesity due " "to excess calories without serious comorbidity with body mass index (BMI) of 36.0 to 36.9 in adult  E66.09 TSH with free T4 reflex    Z68.36 Comprehensive metabolic panel (BMP + Alb, Alk Phos, ALT, AST, Total. Bili, TP)     Hemoglobin A1c   3. Vitamin D deficiency  E55.9 Vitamin D Deficiency   4. Mild major depression - Psychiatry (H)  F32.0    5. Anxiety  F41.9    6. Screening for diabetes mellitus  Z13.1 Hemoglobin A1c   7. Screening for malignant neoplasm of cervix  Z12.4 Pap imaged thin layer screen only - recommended age 21 - 24 years   8. Need for hepatitis C screening test  Z11.59 Hepatitis C Screen Reflex to HCV RNA Quant and Genotype   9. Screening for STDs (sexually transmitted diseases)  Z11.3 NEISSERIA GONORRHOEA PCR     CHLAMYDIA TRACHOMATIS PCR         Patient has been advised of split billing requirements and indicates understanding: No  COUNSELING:  Reviewed preventive health counseling, as reflected in patient instructions       Regular exercise       Healthy diet/nutrition       Immunizations       Contraception       Family planning    Estimated body mass index is 36.8 kg/m  as calculated from the following:    Height as of this encounter: 1.676 m (5' 6\").    Weight as of this encounter: 103.4 kg (228 lb).    Weight management plan: Discussed healthy diet and exercise guidelines    She reports that she has never smoked. She has never used smokeless tobacco.      Counseling Resources:  ATP IV Guidelines  Pooled Cohorts Equation Calculator  Breast Cancer Risk Calculator  BRCA-Related Cancer Risk Assessment: FHS-7 Tool  FRAX Risk Assessment  ICSI Preventive Guidelines  Dietary Guidelines for Americans, 2010  USDA's MyPlate  ASA Prophylaxis  Lung CA Screening    Patricio Copeland MD  [facility]  Answers for HPI/ROS submitted by the patient on 4/4/2021   Annual Exam:  If you checked off any problems, how difficult have these problems made it for you to do your work, take " care of things at home, or get along with other people?: Somewhat difficult  PHQ9 TOTAL SCORE: 8  BRENDA 7 TOTAL SCORE: 16

## 2021-04-02 NOTE — PATIENT INSTRUCTIONS
Enjoy the classes this summer.     Keep up the focus on healthy body movement.     I'm glad you're giving yourself the time to pay attention to and work on mental health.     Preventive Health Recommendations  Female Ages 21 to 25     Yearly exam:     See your health care provider every year in order to  o Review health changes.   o Discuss preventive care.    o Review your medicines if your doctor has prescribed any.      You should be tested each year for STDs (sexually transmitted diseases).       Talk to your provider about how often you should have cholesterol testing.      Get a Pap test every three years. If you have an abnormal result, your doctor may have you test more often.      If you are at risk for diabetes, you should have a diabetes test (fasting glucose).     Shots:     Get a flu shot each year.     Get a tetanus shot every 10 years.     Consider getting the shot (vaccine) that prevents cervical cancer (Gardasil).    Nutrition:     Eat at least 5 servings of fruits and vegetables each day.    Eat whole-grain bread, whole-wheat pasta and brown rice instead of white grains and rice.    Get adequate Calcium and Vitamin D.     Lifestyle    Exercise at least 150 minutes a week each week (30 minutes a day, 5 days a week). This will help you control your weight and prevent disease.    Limit alcohol to one drink per day.    No smoking.     Wear sunscreen to prevent skin cancer.    See your dentist every six months for an exam and cleaning.

## 2021-04-04 ASSESSMENT — ENCOUNTER SYMPTOMS
JOINT SWELLING: 0
HEMATURIA: 0
ABDOMINAL PAIN: 0
PALPITATIONS: 0
FEVER: 0
SHORTNESS OF BREATH: 0
HEADACHES: 1
WEAKNESS: 0
NAUSEA: 0
DIZZINESS: 0
COUGH: 0
FREQUENCY: 0
CHILLS: 0
EYE PAIN: 0
BREAST MASS: 0
CONSTIPATION: 0
MYALGIAS: 0
DIARRHEA: 0
NERVOUS/ANXIOUS: 1
ARTHRALGIAS: 0
PARESTHESIAS: 0
SORE THROAT: 0
HEMATOCHEZIA: 0
DYSURIA: 0
HEARTBURN: 1

## 2021-04-04 ASSESSMENT — ANXIETY QUESTIONNAIRES
7. FEELING AFRAID AS IF SOMETHING AWFUL MIGHT HAPPEN: NOT AT ALL
1. FEELING NERVOUS, ANXIOUS, OR ON EDGE: NEARLY EVERY DAY
2. NOT BEING ABLE TO STOP OR CONTROL WORRYING: NEARLY EVERY DAY
5. BEING SO RESTLESS THAT IT IS HARD TO SIT STILL: SEVERAL DAYS
GAD7 TOTAL SCORE: 16
6. BECOMING EASILY ANNOYED OR IRRITABLE: NEARLY EVERY DAY
7. FEELING AFRAID AS IF SOMETHING AWFUL MIGHT HAPPEN: NOT AT ALL
4. TROUBLE RELAXING: NEARLY EVERY DAY
GAD7 TOTAL SCORE: 16
3. WORRYING TOO MUCH ABOUT DIFFERENT THINGS: NEARLY EVERY DAY
GAD7 TOTAL SCORE: 16

## 2021-04-04 ASSESSMENT — PATIENT HEALTH QUESTIONNAIRE - PHQ9
SUM OF ALL RESPONSES TO PHQ QUESTIONS 1-9: 8
SUM OF ALL RESPONSES TO PHQ QUESTIONS 1-9: 8
10. IF YOU CHECKED OFF ANY PROBLEMS, HOW DIFFICULT HAVE THESE PROBLEMS MADE IT FOR YOU TO DO YOUR WORK, TAKE CARE OF THINGS AT HOME, OR GET ALONG WITH OTHER PEOPLE: SOMEWHAT DIFFICULT

## 2021-04-05 ENCOUNTER — OFFICE VISIT (OUTPATIENT)
Dept: PEDIATRICS | Facility: CLINIC | Age: 21
End: 2021-04-05
Payer: COMMERCIAL

## 2021-04-05 VITALS
WEIGHT: 228 LBS | TEMPERATURE: 98.4 F | OXYGEN SATURATION: 97 % | SYSTOLIC BLOOD PRESSURE: 124 MMHG | BODY MASS INDEX: 36.64 KG/M2 | HEART RATE: 106 BPM | HEIGHT: 66 IN | DIASTOLIC BLOOD PRESSURE: 70 MMHG

## 2021-04-05 DIAGNOSIS — Z11.3 SCREENING FOR STDS (SEXUALLY TRANSMITTED DISEASES): ICD-10-CM

## 2021-04-05 DIAGNOSIS — F32.0 MILD MAJOR DEPRESSION (H): ICD-10-CM

## 2021-04-05 DIAGNOSIS — Z00.00 ROUTINE GENERAL MEDICAL EXAMINATION AT A HEALTH CARE FACILITY: Primary | ICD-10-CM

## 2021-04-05 DIAGNOSIS — Z11.59 NEED FOR HEPATITIS C SCREENING TEST: ICD-10-CM

## 2021-04-05 DIAGNOSIS — E66.812 CLASS 2 OBESITY DUE TO EXCESS CALORIES WITHOUT SERIOUS COMORBIDITY WITH BODY MASS INDEX (BMI) OF 36.0 TO 36.9 IN ADULT: ICD-10-CM

## 2021-04-05 DIAGNOSIS — Z13.1 SCREENING FOR DIABETES MELLITUS: ICD-10-CM

## 2021-04-05 DIAGNOSIS — E66.09 CLASS 2 OBESITY DUE TO EXCESS CALORIES WITHOUT SERIOUS COMORBIDITY WITH BODY MASS INDEX (BMI) OF 36.0 TO 36.9 IN ADULT: ICD-10-CM

## 2021-04-05 DIAGNOSIS — F41.9 ANXIETY: ICD-10-CM

## 2021-04-05 DIAGNOSIS — E55.9 VITAMIN D DEFICIENCY: ICD-10-CM

## 2021-04-05 DIAGNOSIS — Z12.4 SCREENING FOR MALIGNANT NEOPLASM OF CERVIX: ICD-10-CM

## 2021-04-05 LAB
ALBUMIN SERPL-MCNC: 3.8 G/DL (ref 3.4–5)
ALP SERPL-CCNC: 92 U/L (ref 40–150)
ALT SERPL W P-5'-P-CCNC: 46 U/L (ref 0–50)
ANION GAP SERPL CALCULATED.3IONS-SCNC: 5 MMOL/L (ref 3–14)
AST SERPL W P-5'-P-CCNC: 22 U/L (ref 0–45)
BILIRUB SERPL-MCNC: 0.3 MG/DL (ref 0.2–1.3)
BUN SERPL-MCNC: 17 MG/DL (ref 7–30)
CALCIUM SERPL-MCNC: 9.2 MG/DL (ref 8.5–10.1)
CHLORIDE SERPL-SCNC: 108 MMOL/L (ref 94–109)
CO2 SERPL-SCNC: 24 MMOL/L (ref 20–32)
CREAT SERPL-MCNC: 0.79 MG/DL (ref 0.52–1.04)
DEPRECATED CALCIDIOL+CALCIFEROL SERPL-MC: 34 UG/L (ref 20–75)
GFR SERPL CREATININE-BSD FRML MDRD: >90 ML/MIN/{1.73_M2}
GLUCOSE SERPL-MCNC: 112 MG/DL (ref 70–99)
HBA1C MFR BLD: 5.3 % (ref 0–5.6)
HCV AB SERPL QL IA: NONREACTIVE
POTASSIUM SERPL-SCNC: 4.2 MMOL/L (ref 3.4–5.3)
PROT SERPL-MCNC: 7.9 G/DL (ref 6.8–8.8)
SODIUM SERPL-SCNC: 137 MMOL/L (ref 133–144)
TSH SERPL DL<=0.005 MIU/L-ACNC: 1.17 MU/L (ref 0.4–4)

## 2021-04-05 PROCEDURE — 87591 N.GONORRHOEAE DNA AMP PROB: CPT | Performed by: INTERNAL MEDICINE

## 2021-04-05 PROCEDURE — 82306 VITAMIN D 25 HYDROXY: CPT | Performed by: INTERNAL MEDICINE

## 2021-04-05 PROCEDURE — 84443 ASSAY THYROID STIM HORMONE: CPT | Performed by: INTERNAL MEDICINE

## 2021-04-05 PROCEDURE — 87491 CHLMYD TRACH DNA AMP PROBE: CPT | Performed by: INTERNAL MEDICINE

## 2021-04-05 PROCEDURE — G0145 SCR C/V CYTO,THINLAYER,RESCR: HCPCS | Performed by: INTERNAL MEDICINE

## 2021-04-05 PROCEDURE — 80053 COMPREHEN METABOLIC PANEL: CPT | Performed by: INTERNAL MEDICINE

## 2021-04-05 PROCEDURE — 36415 COLL VENOUS BLD VENIPUNCTURE: CPT | Performed by: INTERNAL MEDICINE

## 2021-04-05 PROCEDURE — 86803 HEPATITIS C AB TEST: CPT | Performed by: INTERNAL MEDICINE

## 2021-04-05 PROCEDURE — 99395 PREV VISIT EST AGE 18-39: CPT | Performed by: INTERNAL MEDICINE

## 2021-04-05 PROCEDURE — 83036 HEMOGLOBIN GLYCOSYLATED A1C: CPT | Performed by: INTERNAL MEDICINE

## 2021-04-05 RX ORDER — SERTRALINE HYDROCHLORIDE 100 MG/1
100 TABLET, FILM COATED ORAL DAILY
COMMUNITY
Start: 2021-01-12 | End: 2022-03-14

## 2021-04-05 RX ORDER — HYDROXYZINE HYDROCHLORIDE 10 MG/1
10 TABLET, FILM COATED ORAL 3 TIMES DAILY PRN
COMMUNITY
Start: 2021-03-16 | End: 2022-04-15

## 2021-04-05 ASSESSMENT — ENCOUNTER SYMPTOMS
PALPITATIONS: 0
CHILLS: 0
CONSTIPATION: 0
EYE PAIN: 0
ABDOMINAL PAIN: 0
COUGH: 0
JOINT SWELLING: 0
HEMATOCHEZIA: 0
DYSURIA: 0
SHORTNESS OF BREATH: 0
PARESTHESIAS: 0
HEARTBURN: 1
NERVOUS/ANXIOUS: 1
FREQUENCY: 0
BREAST MASS: 0
ARTHRALGIAS: 0
WEAKNESS: 0
MYALGIAS: 0
SORE THROAT: 0
DIZZINESS: 0
HEADACHES: 1
NAUSEA: 0
DIARRHEA: 0
HEMATURIA: 0
FEVER: 0

## 2021-04-05 ASSESSMENT — PATIENT HEALTH QUESTIONNAIRE - PHQ9: SUM OF ALL RESPONSES TO PHQ QUESTIONS 1-9: 8

## 2021-04-05 ASSESSMENT — ANXIETY QUESTIONNAIRES: GAD7 TOTAL SCORE: 16

## 2021-04-05 ASSESSMENT — MIFFLIN-ST. JEOR: SCORE: 1815.95

## 2021-04-07 LAB
COPATH REPORT: NORMAL
PAP: NORMAL

## 2021-04-22 ENCOUNTER — IMMUNIZATION (OUTPATIENT)
Dept: NURSING | Facility: CLINIC | Age: 21
End: 2021-04-22
Attending: INTERNAL MEDICINE
Payer: COMMERCIAL

## 2021-04-22 PROCEDURE — 91300 PR COVID VAC PFIZER DIL RECON 30 MCG/0.3 ML IM: CPT

## 2021-04-22 PROCEDURE — 0002A PR COVID VAC PFIZER DIL RECON 30 MCG/0.3 ML IM: CPT

## 2021-09-19 ENCOUNTER — HEALTH MAINTENANCE LETTER (OUTPATIENT)
Age: 21
End: 2021-09-19

## 2021-11-28 ENCOUNTER — VIRTUAL VISIT (OUTPATIENT)
Dept: URGENT CARE | Facility: CLINIC | Age: 21
End: 2021-11-28
Payer: COMMERCIAL

## 2021-11-28 DIAGNOSIS — B34.9 VIRAL SYNDROME: Primary | ICD-10-CM

## 2021-11-28 PROCEDURE — 99213 OFFICE O/P EST LOW 20 MIN: CPT | Mod: TEL

## 2021-11-28 NOTE — PATIENT INSTRUCTIONS
"  Patient Education     Viral Syndrome (Adult)  A viral illness may cause a number of symptoms such as fever. Other symptoms depend on the part of the body that the virus affects. If it settles in your nose, throat, and lungs, it may cause cough, sore throat, congestion, runny nose, headache, earache and other ear symptoms, or shortness of breath. If it settles in your stomach and intestinal tract, it may cause nausea, vomiting, cramping, and diarrhea. Sometimes it causes generalized symptoms like \"aching all over,\" feeling tired, loss of energy, or loss of appetite.  A viral illness usually lasts anywhere from several days to several weeks, but sometimes it lasts longer. In some cases, a more serious infection can look like a viral syndrome in the first few days of the illness. You may need another exam and additional tests to know the difference. Watch for the warning signs listed below for when to seek medical advice.  Home care  Follow these guidelines for taking care of yourself at home:    If symptoms are severe, rest at home for the first 2 to 3 days.    Stay away from cigarette smoke - both your smoke and the smoke from others.    You may use over-the-counter acetaminophen or ibuprofen for fever, muscle aching, and headache, unless another medicine was prescribed for this. If you have chronic liver or kidney disease or ever had a stomach ulcer or gastrointestinal bleeding, talk with your healthcare provider before using these medicines. No one who is younger than 18 and ill with a fever should take aspirin. It may cause severe disease or death.    Your appetite may be poor, so a light diet is fine. Avoid dehydration by drinking 8 to 12, 8-ounce glasses of fluids each day. This may include water; orange juice; lemonade; apple, grape, and cranberry juice; clear fruit drinks; electrolyte replacement and sports drinks; and decaffeinated teas and coffee. If you have been diagnosed with a kidney disease, ask your " healthcare provider how much and what types of fluids you should drink to prevent dehydration. If you have kidney disease, drinking too much fluid can cause it build up in the your body and be dangerous to your health.    Over-the-counter remedies won't shorten the length of the illness but may be helpful for symptoms such as cough, sore throat, nasal and sinus congestion, or diarrhea. Don't use decongestants if you have high blood pressure.  Follow-up care  Follow up with your healthcare provider if you do not improve over the next week.  Call 911  Call 911 if any of the following occur:    Convulsion    Feeling weak, dizzy, or like you are going to faint    Chest pain, or more than mild shortness of breath  When to seek medical advice  Call your healthcare provider right away if any of these occur:    Cough with lots of colored sputum (mucus) or blood in your sputum    Chest pain, shortness of breath, wheezing, or trouble breathing    Severe headache; face, neck, or ear pain    Severe, constant pain in the lower right side of your belly (abdominal)    Continued vomiting (can t keep liquids down)    Frequent diarrhea (more than 5 times a day); blood (red or black color) or mucus in diarrhea    Feeling weak, dizzy, or like you are going to faint    Extreme thirst    Fever of 100.4 F (38 C) or higher, or as directed by your healthcare provider  [employer] last reviewed this educational content on 4/1/2018 2000-2021 [employer]. All rights reserved. This information is not intended as a substitute for professional medical care. Always follow your healthcare professional's instructions.           Patient Education     Nonspecific Vomiting and Diarrhea (Adult)  Vomiting and diarrhea can have many causes, including:    Helping your body get rid of harmful substances     Gastroenteritis caused by viruses, parasites, bacteria, or toxins.    Allergy to or side effect of a food or medicine    Severe stress or  worry (anxiety)     Other illnesses    Pregnancy  It is often hard to pinpoint an exact cause, even with testing. Vomiting and diarrhea often go away within a day or two without problems. If they continue, though, they can lead to too much loss of fluid (dehydration). This can be serious if not treated.    Home care  Medicines    You may use acetaminophen or NSAID medicines like ibuprofen or naproxen to control fever, unless another medicine was prescribed. If you have chronic liver or kidney disease, talk with your healthcare provider before using these medicines. Also talk with your provider if you've had a stomach ulcer or gastrointestinal bleeding. Don't give aspirin to anyone under 18 years of age who is ill with a fever because it may cause severe disease or death. Don't use NSAID medicines if you are already taking one for another condition (like arthritis) or are on aspirin (such as for heart disease or after a stroke)    Over-the-counter medicines for diarrhea, nausea, and vomiting are generally OK unless you have bleeding, fever, or severe abdominal pain.  General care    If symptoms are severe, rest at home for the next 24 hours, or until you are feeling better.    Washing your hands with soap and water, or using alcohol-based hand  is the best way to stop the spread of infection. Wash your hands after touching anyone who is sick.    Wash your hands after using the toilet and before meals. Clean the toilet after each use.    Dry your hands with a single use towel.    Caffeine, tobacco, and alcohol can make the diarrhea, cramping, and pain worse. Remember, caffeine not only is in coffee, but also is in chocolate, some energy drinks, and teas.  Diet    Water and clear liquids are important so you don't get dehydrated. Drink a small amount at a time. Don't guzzle down the drinks. That may increase your nausea, make cramping worse, and cause the drinks to come back up.    Sports drinks may also help  if you are healthy and not too dehydrated. They have too much sugar and not enough electrolytes and can sometimes make things worse. Also, don't drink beverages that are too acidic, like orange juice and grape juice.    If you are very dehydrated, commercially available products called oral rehydration solutions are best.  Food    Don't force yourself to eat, especially if you have cramps, diarrhea, or vomiting. Eat just a little at a time, and then wait a few minutes before you try to eat more.    Don't eat fatty, greasy, spicy, or fried foods.    Don't eat dairy products if you have diarrhea. They can make it worse.  During the first 24 hours (the first full day), follow the diet below:    Beverages: Oral rehydration solutions, sports drinks, soft drinks without caffeine, mineral water, and decaffeinated tea and coffee    Soups: Clear broth, consommé, and bouillon    Desserts: Plain gelatin, popsicles, and fruit juice bars  During the next 24 hours (the second day), you may add the following to the above if you are better. If not, continue what you did the first day:    Hot cereal, plain toast, bread, rolls, crackers    Plain noodles, rice, mashed potatoes, chicken noodle or rice soup    Unsweetened canned fruit (avoid pineapple), bananas    Limit fat intake to less than 15 grams per day by avoiding margarine, butter, oils, mayonnaise, sauces, gravies, fried foods, peanut butter, meat, poultry, and fish.    Limit fiber. Avoid raw or cooked vegetables, fresh fruits (except bananas) and bran cereals.    Limit caffeine and chocolate. No spices or seasonings except salt.  During the next 24 hours:    Gradually resume a normal diet, as you feel better and your symptoms improve.    If at any time your symptoms start getting worse again, go back to clear liquids until you feel better.  Food preparation    If you have diarrhea, you should not prepare food for others. When preparing foods, wash your hands before and  after.    Wash your hands or use alcohol-based  after using cutting boards, countertops, and knives that have been in contact with raw food.    Dry your hands with a single use towel.    Keep uncooked meats away from cooked and ready-to-eat foods.    Follow-up care  Follow up with your healthcare provider, or as advised. Call if you don't get better in the next 2 to 3 days. If a stool (diarrhea) sample was taken, or cultures done, you will be told if they are positive, or if your treatment needs to be changed. You may call as directed for the results.  If X-rays were taken, you will be notified of any new findings that may affect your care  Call 911  Call 911 if any of these occur:    Trouble breathing    Chest pain    Confusion    Severe drowsiness or trouble awakening    Fainting or loss of consciousness    Rapid heart rate    Seizure    Stiff neck    Severe weakness, dizziness, or lightheadedness  When to seek medical advice  Call your healthcare provider right away if any of these occur:    Bloody or black vomit or stools    Severe, steady abdominal pain or any abdominal pain that is getting worse    Severe headache or stiff neck    An inability to hold down even sips of liquids for more than 12 hours    Vomiting that lasts more than 24 hours    Diarrhea that lasts more than 24 hours    Fever of 100.4 F (38.0 C) or higher, or as directed by your healthcare provider    Yellowish color to your skin or the whites of your eyes    Signs of dehydration, such as dry mouth, little urine (less than every 6 hours), or very dark urine  [email or web address] last reviewed this educational content on 6/1/2018 2000-2021 [employer]. All rights reserved. This information is not intended as a substitute for professional medical care. Always follow your healthcare professional's instructions.

## 2021-11-28 NOTE — PROGRESS NOTES
"The patient has been notified of following:     \"This telephone visit will be conducted via a call between you and your physician/provider. We have found that certain health care needs can be provided without the need for a physical exam.  This service lets us provide the care you need with a short phone conversation.  If a prescription is necessary we can send it directly to your pharmacy.  If lab work is needed we can place an order for that and you can then stop by our lab to have the test done at a later time.    Telephone visits are billed at different rates depending on your insurance coverage. During this emergency period, for some insurers they may be billed the same as an in-person visit.  Please reach out to your insurance provider with any questions.    If during the course of the call the physician/provider feels a telephone visit is not appropriate, you will not be charged for this service.\"      Assessment & Plan:       See patient instructions below.  At the end of the encounter, I discussed diagnosis & medications. Discussed red flags for being seen in person at clinic/ER, as well as indications for follow up if no improvement. Patient understood and agreed to plan.       ICD-10-CM    1. Viral syndrome  B34.9 Symptomatic COVID-19 Virus (Coronavirus) by PCR         No follow-ups on file.    Phone Call Duration : 5 minutes    Mika Paul PA-C , [facility]  [facility]  -----------------------------------------------------------------------------------------------------------------------------------------------------------------    Subjective:   Mariah Arboleda is a 20 year old female who is contacted via telephone through scheduled urgent care virtual visit to discuss: loose stools and vomiting yesterday into last night. Last vomit was 530am.  Loose stools in last: >10, no blood.  No known exposures.  Feverish with mild headache.        No treatments tried. Patient reports no " "chest pain, shortness of breath, abdominal pain, nausea, rash, or any other symptoms.     Past Medical History:   Diagnosis Date     Pertussis 2016         Objective:   Gen:  NAD  Pulm: non-labored work of breathing    No results found for any visits on 11/28/21.    Patient Instructions     Patient Education     Viral Syndrome (Adult)  A viral illness may cause a number of symptoms such as fever. Other symptoms depend on the part of the body that the virus affects. If it settles in your nose, throat, and lungs, it may cause cough, sore throat, congestion, runny nose, headache, earache and other ear symptoms, or shortness of breath. If it settles in your stomach and intestinal tract, it may cause nausea, vomiting, cramping, and diarrhea. Sometimes it causes generalized symptoms like \"aching all over,\" feeling tired, loss of energy, or loss of appetite.  A viral illness usually lasts anywhere from several days to several weeks, but sometimes it lasts longer. In some cases, a more serious infection can look like a viral syndrome in the first few days of the illness. You may need another exam and additional tests to know the difference. Watch for the warning signs listed below for when to seek medical advice.  Home care  Follow these guidelines for taking care of yourself at home:    If symptoms are severe, rest at home for the first 2 to 3 days.    Stay away from cigarette smoke - both your smoke and the smoke from others.    You may use over-the-counter acetaminophen or ibuprofen for fever, muscle aching, and headache, unless another medicine was prescribed for this. If you have chronic liver or kidney disease or ever had a stomach ulcer or gastrointestinal bleeding, talk with your healthcare provider before using these medicines. No one who is younger than 18 and ill with a fever should take aspirin. It may cause severe disease or death.    Your appetite may be poor, so a light diet is fine. Avoid dehydration by " drinking 8 to 12, 8-ounce glasses of fluids each day. This may include water; orange juice; lemonade; apple, grape, and cranberry juice; clear fruit drinks; electrolyte replacement and sports drinks; and decaffeinated teas and coffee. If you have been diagnosed with a kidney disease, ask your healthcare provider how much and what types of fluids you should drink to prevent dehydration. If you have kidney disease, drinking too much fluid can cause it build up in the your body and be dangerous to your health.    Over-the-counter remedies won't shorten the length of the illness but may be helpful for symptoms such as cough, sore throat, nasal and sinus congestion, or diarrhea. Don't use decongestants if you have high blood pressure.  Follow-up care  Follow up with your healthcare provider if you do not improve over the next week.  Call 911  Call 911 if any of the following occur:    Convulsion    Feeling weak, dizzy, or like you are going to faint    Chest pain, or more than mild shortness of breath  When to seek medical advice  Call your healthcare provider right away if any of these occur:    Cough with lots of colored sputum (mucus) or blood in your sputum    Chest pain, shortness of breath, wheezing, or trouble breathing    Severe headache; face, neck, or ear pain    Severe, constant pain in the lower right side of your belly (abdominal)    Continued vomiting (can t keep liquids down)    Frequent diarrhea (more than 5 times a day); blood (red or black color) or mucus in diarrhea    Feeling weak, dizzy, or like you are going to faint    Extreme thirst    Fever of 100.4 F (38 C) or higher, or as directed by your healthcare provider  [employer]jit last reviewed this educational content on 4/1/2018 2000-2021 [employer]. All rights reserved. This information is not intended as a substitute for professional medical care. Always follow your healthcare professional's instructions.           Patient Education      Nonspecific Vomiting and Diarrhea (Adult)  Vomiting and diarrhea can have many causes, including:    Helping your body get rid of harmful substances     Gastroenteritis caused by viruses, parasites, bacteria, or toxins.    Allergy to or side effect of a food or medicine    Severe stress or worry (anxiety)     Other illnesses    Pregnancy  It is often hard to pinpoint an exact cause, even with testing. Vomiting and diarrhea often go away within a day or two without problems. If they continue, though, they can lead to too much loss of fluid (dehydration). This can be serious if not treated.    Home care  Medicines    You may use acetaminophen or NSAID medicines like ibuprofen or naproxen to control fever, unless another medicine was prescribed. If you have chronic liver or kidney disease, talk with your healthcare provider before using these medicines. Also talk with your provider if you've had a stomach ulcer or gastrointestinal bleeding. Don't give aspirin to anyone under 18 years of age who is ill with a fever because it may cause severe disease or death. Don't use NSAID medicines if you are already taking one for another condition (like arthritis) or are on aspirin (such as for heart disease or after a stroke)    Over-the-counter medicines for diarrhea, nausea, and vomiting are generally OK unless you have bleeding, fever, or severe abdominal pain.  General care    If symptoms are severe, rest at home for the next 24 hours, or until you are feeling better.    Washing your hands with soap and water, or using alcohol-based hand  is the best way to stop the spread of infection. Wash your hands after touching anyone who is sick.    Wash your hands after using the toilet and before meals. Clean the toilet after each use.    Dry your hands with a single use towel.    Caffeine, tobacco, and alcohol can make the diarrhea, cramping, and pain worse. Remember, caffeine not only is in coffee, but also is in  chocolate, some energy drinks, and teas.  Diet    Water and clear liquids are important so you don't get dehydrated. Drink a small amount at a time. Don't guzzle down the drinks. That may increase your nausea, make cramping worse, and cause the drinks to come back up.    Sports drinks may also help if you are healthy and not too dehydrated. They have too much sugar and not enough electrolytes and can sometimes make things worse. Also, don't drink beverages that are too acidic, like orange juice and grape juice.    If you are very dehydrated, commercially available products called oral rehydration solutions are best.  Food    Don't force yourself to eat, especially if you have cramps, diarrhea, or vomiting. Eat just a little at a time, and then wait a few minutes before you try to eat more.    Don't eat fatty, greasy, spicy, or fried foods.    Don't eat dairy products if you have diarrhea. They can make it worse.  During the first 24 hours (the first full day), follow the diet below:    Beverages: Oral rehydration solutions, sports drinks, soft drinks without caffeine, mineral water, and decaffeinated tea and coffee    Soups: Clear broth, consommé, and bouillon    Desserts: Plain gelatin, popsicles, and fruit juice bars  During the next 24 hours (the second day), you may add the following to the above if you are better. If not, continue what you did the first day:    Hot cereal, plain toast, bread, rolls, crackers    Plain noodles, rice, mashed potatoes, chicken noodle or rice soup    Unsweetened canned fruit (avoid pineapple), bananas    Limit fat intake to less than 15 grams per day by avoiding margarine, butter, oils, mayonnaise, sauces, gravies, fried foods, peanut butter, meat, poultry, and fish.    Limit fiber. Avoid raw or cooked vegetables, fresh fruits (except bananas) and bran cereals.    Limit caffeine and chocolate. No spices or seasonings except salt.  During the next 24 hours:    Gradually resume a  normal diet, as you feel better and your symptoms improve.    If at any time your symptoms start getting worse again, go back to clear liquids until you feel better.  Food preparation    If you have diarrhea, you should not prepare food for others. When preparing foods, wash your hands before and after.    Wash your hands or use alcohol-based  after using cutting boards, countertops, and knives that have been in contact with raw food.    Dry your hands with a single use towel.    Keep uncooked meats away from cooked and ready-to-eat foods.    Follow-up care  Follow up with your healthcare provider, or as advised. Call if you don't get better in the next 2 to 3 days. If a stool (diarrhea) sample was taken, or cultures done, you will be told if they are positive, or if your treatment needs to be changed. You may call as directed for the results.  If X-rays were taken, you will be notified of any new findings that may affect your care  Call 911  Call 911 if any of these occur:    Trouble breathing    Chest pain    Confusion    Severe drowsiness or trouble awakening    Fainting or loss of consciousness    Rapid heart rate    Seizure    Stiff neck    Severe weakness, dizziness, or lightheadedness  When to seek medical advice  Call your healthcare provider right away if any of these occur:    Bloody or black vomit or stools    Severe, steady abdominal pain or any abdominal pain that is getting worse    Severe headache or stiff neck    An inability to hold down even sips of liquids for more than 12 hours    Vomiting that lasts more than 24 hours    Diarrhea that lasts more than 24 hours    Fever of 100.4 F (38.0 C) or higher, or as directed by your healthcare provider    Yellowish color to your skin or the whites of your eyes    Signs of dehydration, such as dry mouth, little urine (less than every 6 hours), or very dark urine  [employer] last reviewed this educational content on 6/1/2018 2000-2021 [employer]. All rights reserved. This information is not intended as a substitute for professional medical care. Always follow your healthcare professional's instructions.

## 2022-03-14 PROBLEM — F32.9 MAJOR DEPRESSION: Status: ACTIVE | Noted: 2020-08-01

## 2022-03-14 PROBLEM — F41.9 ANXIETY: Status: ACTIVE | Noted: 2018-09-12

## 2022-04-14 SDOH — ECONOMIC STABILITY: FOOD INSECURITY: WITHIN THE PAST 12 MONTHS, YOU WORRIED THAT YOUR FOOD WOULD RUN OUT BEFORE YOU GOT MONEY TO BUY MORE.: NEVER TRUE

## 2022-04-14 SDOH — ECONOMIC STABILITY: TRANSPORTATION INSECURITY
IN THE PAST 12 MONTHS, HAS LACK OF TRANSPORTATION KEPT YOU FROM MEETINGS, WORK, OR FROM GETTING THINGS NEEDED FOR DAILY LIVING?: NO

## 2022-04-14 SDOH — ECONOMIC STABILITY: TRANSPORTATION INSECURITY
IN THE PAST 12 MONTHS, HAS THE LACK OF TRANSPORTATION KEPT YOU FROM MEDICAL APPOINTMENTS OR FROM GETTING MEDICATIONS?: NO

## 2022-04-14 SDOH — HEALTH STABILITY: PHYSICAL HEALTH: ON AVERAGE, HOW MANY MINUTES DO YOU ENGAGE IN EXERCISE AT THIS LEVEL?: 40 MIN

## 2022-04-14 SDOH — ECONOMIC STABILITY: FOOD INSECURITY: WITHIN THE PAST 12 MONTHS, THE FOOD YOU BOUGHT JUST DIDN'T LAST AND YOU DIDN'T HAVE MONEY TO GET MORE.: NEVER TRUE

## 2022-04-14 SDOH — ECONOMIC STABILITY: INCOME INSECURITY: HOW HARD IS IT FOR YOU TO PAY FOR THE VERY BASICS LIKE FOOD, HOUSING, MEDICAL CARE, AND HEATING?: NOT VERY HARD

## 2022-04-14 SDOH — HEALTH STABILITY: PHYSICAL HEALTH: ON AVERAGE, HOW MANY DAYS PER WEEK DO YOU ENGAGE IN MODERATE TO STRENUOUS EXERCISE (LIKE A BRISK WALK)?: 2 DAYS

## 2022-04-14 SDOH — ECONOMIC STABILITY: INCOME INSECURITY: IN THE LAST 12 MONTHS, WAS THERE A TIME WHEN YOU WERE NOT ABLE TO PAY THE MORTGAGE OR RENT ON TIME?: NO

## 2022-04-14 ASSESSMENT — ENCOUNTER SYMPTOMS
BREAST MASS: 0
JOINT SWELLING: 0
HEMATOCHEZIA: 0
HEADACHES: 0
PALPITATIONS: 0
WEAKNESS: 0
DYSURIA: 0
CHILLS: 0
PARESTHESIAS: 0
SORE THROAT: 0
NERVOUS/ANXIOUS: 1
FEVER: 0
FREQUENCY: 0
HEARTBURN: 0
ARTHRALGIAS: 0
DIZZINESS: 0
ABDOMINAL PAIN: 0
CONSTIPATION: 0
MYALGIAS: 0
NAUSEA: 0
EYE PAIN: 0
HEMATURIA: 0
SHORTNESS OF BREATH: 0
COUGH: 0
DIARRHEA: 0

## 2022-04-14 ASSESSMENT — LIFESTYLE VARIABLES
AUDIT-C TOTAL SCORE: 4
HOW OFTEN DO YOU HAVE SIX OR MORE DRINKS ON ONE OCCASION: LESS THAN MONTHLY
HOW MANY STANDARD DRINKS CONTAINING ALCOHOL DO YOU HAVE ON A TYPICAL DAY: 3 OR 4
SKIP TO QUESTIONS 9-10: 0
HOW OFTEN DO YOU HAVE A DRINK CONTAINING ALCOHOL: 2-4 TIMES A MONTH

## 2022-04-14 ASSESSMENT — SOCIAL DETERMINANTS OF HEALTH (SDOH)
ARE YOU MARRIED, WIDOWED, DIVORCED, SEPARATED, NEVER MARRIED, OR LIVING WITH A PARTNER?: NEVER MARRIED
IN A TYPICAL WEEK, HOW MANY TIMES DO YOU TALK ON THE PHONE WITH FAMILY, FRIENDS, OR NEIGHBORS?: THREE TIMES A WEEK
DO YOU BELONG TO ANY CLUBS OR ORGANIZATIONS SUCH AS CHURCH GROUPS UNIONS, FRATERNAL OR ATHLETIC GROUPS, OR SCHOOL GROUPS?: NO
HOW OFTEN DO YOU GET TOGETHER WITH FRIENDS OR RELATIVES?: ONCE A WEEK
HOW OFTEN DO YOU ATTEND CHURCH OR RELIGIOUS SERVICES?: NEVER

## 2022-04-15 ENCOUNTER — OFFICE VISIT (OUTPATIENT)
Dept: PEDIATRICS | Facility: CLINIC | Age: 22
End: 2022-04-15
Payer: COMMERCIAL

## 2022-04-15 VITALS
BODY MASS INDEX: 38.25 KG/M2 | HEART RATE: 78 BPM | OXYGEN SATURATION: 96 % | WEIGHT: 238 LBS | DIASTOLIC BLOOD PRESSURE: 70 MMHG | RESPIRATION RATE: 16 BRPM | HEIGHT: 66 IN | TEMPERATURE: 97.2 F | SYSTOLIC BLOOD PRESSURE: 120 MMHG

## 2022-04-15 DIAGNOSIS — Z00.00 ROUTINE GENERAL MEDICAL EXAMINATION AT A HEALTH CARE FACILITY: Primary | ICD-10-CM

## 2022-04-15 DIAGNOSIS — F32.0 MILD MAJOR DEPRESSION (H): ICD-10-CM

## 2022-04-15 DIAGNOSIS — R53.83 FATIGUE, UNSPECIFIED TYPE: ICD-10-CM

## 2022-04-15 DIAGNOSIS — E66.3 OVERWEIGHT (BMI 25.0-29.9): ICD-10-CM

## 2022-04-15 DIAGNOSIS — Z11.3 SCREENING FOR STDS (SEXUALLY TRANSMITTED DISEASES): ICD-10-CM

## 2022-04-15 LAB
ERYTHROCYTE [DISTWIDTH] IN BLOOD BY AUTOMATED COUNT: 12.9 % (ref 10–15)
HCT VFR BLD AUTO: 41.3 % (ref 35–47)
HGB BLD-MCNC: 13.9 G/DL (ref 11.7–15.7)
MCH RBC QN AUTO: 29.2 PG (ref 26.5–33)
MCHC RBC AUTO-ENTMCNC: 33.7 G/DL (ref 31.5–36.5)
MCV RBC AUTO: 87 FL (ref 78–100)
PLATELET # BLD AUTO: 381 10E3/UL (ref 150–450)
RBC # BLD AUTO: 4.76 10E6/UL (ref 3.8–5.2)
WBC # BLD AUTO: 5.9 10E3/UL (ref 4–11)

## 2022-04-15 PROCEDURE — 90471 IMMUNIZATION ADMIN: CPT | Performed by: INTERNAL MEDICINE

## 2022-04-15 PROCEDURE — 99395 PREV VISIT EST AGE 18-39: CPT | Mod: 25 | Performed by: INTERNAL MEDICINE

## 2022-04-15 PROCEDURE — 86780 TREPONEMA PALLIDUM: CPT | Performed by: INTERNAL MEDICINE

## 2022-04-15 PROCEDURE — 99214 OFFICE O/P EST MOD 30 MIN: CPT | Mod: 25 | Performed by: INTERNAL MEDICINE

## 2022-04-15 PROCEDURE — 87389 HIV-1 AG W/HIV-1&-2 AB AG IA: CPT | Performed by: INTERNAL MEDICINE

## 2022-04-15 PROCEDURE — 87491 CHLMYD TRACH DNA AMP PROBE: CPT | Performed by: INTERNAL MEDICINE

## 2022-04-15 PROCEDURE — 36415 COLL VENOUS BLD VENIPUNCTURE: CPT | Performed by: INTERNAL MEDICINE

## 2022-04-15 PROCEDURE — 90715 TDAP VACCINE 7 YRS/> IM: CPT | Performed by: INTERNAL MEDICINE

## 2022-04-15 PROCEDURE — 82728 ASSAY OF FERRITIN: CPT | Performed by: INTERNAL MEDICINE

## 2022-04-15 PROCEDURE — 85027 COMPLETE CBC AUTOMATED: CPT | Performed by: INTERNAL MEDICINE

## 2022-04-15 PROCEDURE — 84443 ASSAY THYROID STIM HORMONE: CPT | Performed by: INTERNAL MEDICINE

## 2022-04-15 PROCEDURE — 87591 N.GONORRHOEAE DNA AMP PROB: CPT | Performed by: INTERNAL MEDICINE

## 2022-04-15 RX ORDER — HYDROXYZINE HYDROCHLORIDE 10 MG/1
10 TABLET, FILM COATED ORAL 3 TIMES DAILY PRN
Qty: 30 TABLET | Refills: 3 | Status: SHIPPED | OUTPATIENT
Start: 2022-04-15 | End: 2023-07-17

## 2022-04-15 RX ORDER — FLUOXETINE 10 MG/1
30 CAPSULE ORAL DAILY
Qty: 270 CAPSULE | Refills: 3 | Status: SHIPPED | OUTPATIENT
Start: 2022-04-15 | End: 2023-07-17

## 2022-04-15 ASSESSMENT — ANXIETY QUESTIONNAIRES
7. FEELING AFRAID AS IF SOMETHING AWFUL MIGHT HAPPEN: NOT AT ALL
3. WORRYING TOO MUCH ABOUT DIFFERENT THINGS: MORE THAN HALF THE DAYS
1. FEELING NERVOUS, ANXIOUS, OR ON EDGE: MORE THAN HALF THE DAYS
5. BEING SO RESTLESS THAT IT IS HARD TO SIT STILL: NOT AT ALL
IF YOU CHECKED OFF ANY PROBLEMS ON THIS QUESTIONNAIRE, HOW DIFFICULT HAVE THESE PROBLEMS MADE IT FOR YOU TO DO YOUR WORK, TAKE CARE OF THINGS AT HOME, OR GET ALONG WITH OTHER PEOPLE: SOMEWHAT DIFFICULT
2. NOT BEING ABLE TO STOP OR CONTROL WORRYING: MORE THAN HALF THE DAYS
6. BECOMING EASILY ANNOYED OR IRRITABLE: SEVERAL DAYS
GAD7 TOTAL SCORE: 9

## 2022-04-15 ASSESSMENT — ENCOUNTER SYMPTOMS
BREAST MASS: 0
HEADACHES: 0
WEAKNESS: 0
EYE PAIN: 0
MYALGIAS: 0
HEMATOCHEZIA: 0
HEARTBURN: 0
DIARRHEA: 0
SORE THROAT: 0
JOINT SWELLING: 0
ARTHRALGIAS: 0
CHILLS: 0
SHORTNESS OF BREATH: 0
DIZZINESS: 0
PARESTHESIAS: 0
HEMATURIA: 0
ABDOMINAL PAIN: 0
NERVOUS/ANXIOUS: 1
FREQUENCY: 0
COUGH: 0
CONSTIPATION: 0
NAUSEA: 0
FEVER: 0
PALPITATIONS: 0
DYSURIA: 0

## 2022-04-15 ASSESSMENT — PATIENT HEALTH QUESTIONNAIRE - PHQ9
5. POOR APPETITE OR OVEREATING: MORE THAN HALF THE DAYS
SUM OF ALL RESPONSES TO PHQ QUESTIONS 1-9: 13

## 2022-04-15 NOTE — PROGRESS NOTES
SUBJECTIVE:   CC: Mariah Arboleda is an 21 year old woman who presents for preventive health visit.     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     # Mental health is pretty good  - therapy weekly has been a huge help  - prozac 30, rare atarax    # Sleep  - goes to bed around 10-11, wakes up for classes at 7am  - still feeling tired most days  - wants to take a nap  - notices the same w/ school/work  - hits her mid afternoon after lunch  - snores pretty loudly   - still feels tired sometimes in the morning, no headaches  - eats lunch every day, sandwhich     # Popping in ears  - some nasal congestion     Today's PHQ-2 Score:   PHQ-2 ( 1999 Pfizer) 4/14/2022   Q1: Little interest or pleasure in doing things 1   Q2: Feeling down, depressed or hopeless 1   PHQ-2 Score 2   PHQ-2 Total Score (12-17 Years)- Positive if 3 or more points; Administer PHQ-A if positive -   Q1: Little interest or pleasure in doing things Several days   Q2: Feeling down, depressed or hopeless Several days   PHQ-2 Score 2       Abuse: Current or Past (Physical, Sexual or Emotional) - No  Do you feel safe in your environment? Yes    Have you ever done Advance Care Planning? (For example, a Health Directive, POLST, or a discussion with a medical provider or your loved ones about your wishes): No, advance care planning information given to patient to review.  Patient plans to discuss their wishes with loved ones or provider.      Social History  "    Tobacco Use     Smoking status: Never Smoker     Smokeless tobacco: Never Used   Substance Use Topics     Alcohol use: Yes         Alcohol Use 4/14/2022   Prescreen: >3 drinks/day or >7 drinks/week? No   Prescreen: >3 drinks/day or >7 drinks/week? -       Reviewed orders with patient.  Reviewed health maintenance and updated orders accordingly - Yes    Breast Cancer Screening:    History of abnormal Pap smear: NO - age 30-65 PAP every 5 years with negative HPV co-testing recommended  PAP / HPV 4/5/2021   PAP (Historical) NIL     Reviewed and updated as needed this visit by clinical staff   Tobacco  Allergies    Med Hx  Surg Hx  Fam Hx  Soc Hx          Reviewed and updated as needed this visit by Provider                     Review of Systems   Constitutional: Negative for chills and fever.   HENT: Positive for ear pain. Negative for congestion, hearing loss and sore throat.    Eyes: Negative for pain and visual disturbance.   Respiratory: Negative for cough and shortness of breath.    Cardiovascular: Negative for chest pain, palpitations and peripheral edema.   Gastrointestinal: Negative for abdominal pain, constipation, diarrhea, heartburn, hematochezia and nausea.   Breasts:  Negative for tenderness, breast mass and discharge.   Genitourinary: Positive for vaginal discharge. Negative for dysuria, frequency, genital sores, hematuria, pelvic pain, urgency and vaginal bleeding.   Musculoskeletal: Negative for arthralgias, joint swelling and myalgias.   Skin: Negative for rash.   Neurological: Negative for dizziness, weakness, headaches and paresthesias.   Psychiatric/Behavioral: Negative for mood changes. The patient is nervous/anxious.         OBJECTIVE:   /70   Pulse 78   Temp 97.2  F (36.2  C) (Tympanic)   Resp 16   Ht 1.676 m (5' 6\")   Wt 108 kg (238 lb)   SpO2 96%   BMI 38.41 kg/m    Physical Exam  GENERAL: healthy, alert and no distress  EYES: Eyes grossly normal to inspection, PERRL and " "conjunctivae and sclerae normal  HENT: ear canals and TM's normal, small effusion on right, nose and mouth without ulcers or lesions  NECK: no adenopathy, no asymmetry, masses, or scars and thyroid normal to palpation  RESP: lungs clear to auscultation - no rales, rhonchi or wheezes  CV: regular rate and rhythm, normal S1 S2, no S3 or S4, no murmur, click or rub, no peripheral edema and peripheral pulses strong  ABDOMEN: soft, nontender, no hepatosplenomegaly, no masses and bowel sounds normal  MS: no gross musculoskeletal defects noted, no edema  SKIN: no suspicious lesions or rashes  NEURO: Normal strength and tone, mentation intact and speech normal  PSYCH: mentation appears normal, affect normal/bright    Diagnostic Test Results:  none     ASSESSMENT/PLAN:       ICD-10-CM    1. Routine general medical examination at a health care facility  Z00.00    2. Overweight (BMI 25.0-29.9)  E66.3    3. Mild major depression (H)  F32.0 FLUoxetine (PROZAC) 10 MG capsule     hydrOXYzine (ATARAX) 10 MG tablet   4. Screening for STDs (sexually transmitted diseases)  Z11.3 NEISSERIA GONORRHOEA PCR     CHLAMYDIA TRACHOMATIS PCR     HIV Antigen Antibody Combo     Treponema Abs w Reflex to RPR and Titer     NEISSERIA GONORRHOEA PCR     CHLAMYDIA TRACHOMATIS PCR     HIV Antigen Antibody Combo     Treponema Abs w Reflex to RPR and Titer   5. Fatigue, unspecified type  R53.83 TSH with free T4 reflex     CBC with platelets     Ferritin     TSH with free T4 reflex     CBC with platelets     Ferritin     Overall doing well. Small amount of fluid in ear -> flonase.     See PI for ways to combat post lunch tiredness - if no improvement could consider sleep study w/ snoring but feel this is less likely.         COUNSELING:  Reviewed preventive health counseling, as reflected in patient instructions    Estimated body mass index is 38.41 kg/m  as calculated from the following:    Height as of this encounter: 1.676 m (5' 6\").    Weight as of " this encounter: 108 kg (238 lb).    Weight management plan: Discussed healthy diet and exercise guidelines    She reports that she has never smoked. She has never used smokeless tobacco.      Counseling Resources:  ATP IV Guidelines  Pooled Cohorts Equation Calculator  Breast Cancer Risk Calculator  BRCA-Related Cancer Risk Assessment: FHS-7 Tool  FRAX Risk Assessment  ICSI Preventive Guidelines  Dietary Guidelines for Americans, 2010  USDA's MyPlate  ASA Prophylaxis  Lung CA Screening    Patricio Copeland MD  [facility]

## 2022-04-15 NOTE — PATIENT INSTRUCTIONS
Tiredness  - checking thyroid  - try late morning snack, lighter lunch, and mid afternoon snack to even out body energy    Preventive Health Recommendations  Female Ages 21 to 25     Yearly exam:   See your health care provider every year in order to  Review health changes.   Discuss preventive care.    Review your medicines if your doctor has prescribed any.    You should be tested each year for STDs (sexually transmitted diseases).     Talk to your provider about how often you should have cholesterol testing.    Get a Pap test every three years. If you have an abnormal result, your doctor may have you test more often.    If you are at risk for diabetes, you should have a diabetes test (fasting glucose).     Shots:   Get a flu shot each year.   Get a tetanus shot every 10 years.   Consider getting the shot (vaccine) that prevents cervical cancer (Gardasil).    Nutrition:   Eat at least 5 servings of fruits and vegetables each day.  Eat whole-grain bread, whole-wheat pasta and brown rice instead of white grains and rice.  Get adequate Calcium and Vitamin D.     Lifestyle  Exercise at least 150 minutes a week each week (30 minutes a day, 5 days a week). This will help you control your weight and prevent disease.  Limit alcohol to one drink per day.  No smoking.   Wear sunscreen to prevent skin cancer.  See your dentist every six months for an exam and cleaning.

## 2022-04-16 LAB
FERRITIN SERPL-MCNC: 44 NG/ML (ref 12–150)
TSH SERPL DL<=0.005 MIU/L-ACNC: 0.89 MU/L (ref 0.4–4)

## 2022-04-16 ASSESSMENT — ANXIETY QUESTIONNAIRES: GAD7 TOTAL SCORE: 9

## 2022-04-17 LAB — T PALLIDUM AB SER QL: NONREACTIVE

## 2022-04-18 LAB — HIV 1+2 AB+HIV1 P24 AG SERPL QL IA: NONREACTIVE

## 2022-04-20 LAB
C TRACH DNA SPEC QL NAA+PROBE: NEGATIVE
N GONORRHOEA DNA SPEC QL NAA+PROBE: NEGATIVE

## 2022-06-23 ENCOUNTER — VIRTUAL VISIT (OUTPATIENT)
Dept: FAMILY MEDICINE | Facility: CLINIC | Age: 22
End: 2022-06-23
Payer: COMMERCIAL

## 2022-06-23 ENCOUNTER — MYC MEDICAL ADVICE (OUTPATIENT)
Dept: FAMILY MEDICINE | Facility: CLINIC | Age: 22
End: 2022-06-23

## 2022-06-23 DIAGNOSIS — U07.1 INFECTION DUE TO 2019 NOVEL CORONAVIRUS: Primary | ICD-10-CM

## 2022-06-23 PROCEDURE — 99213 OFFICE O/P EST LOW 20 MIN: CPT | Mod: 95 | Performed by: NURSE PRACTITIONER

## 2022-06-23 NOTE — TELEPHONE ENCOUNTER
Routing to Provider to review and advise.     Refer to patients mychart message.     Rebecca Harper RN BSN PHN

## 2022-06-23 NOTE — PROGRESS NOTES
Mariah is a 21 year old who is being evaluated via a billable telephone visit.      What phone number would you like to be contacted at? [phone]  How would you like to obtain your AVS? MyChart    Assessment & Plan     Infection due to 2019 novel coronavirus    - nirmatrelvir and ritonavir (PAXLOVID) therapy pack; Take 3 tablets by mouth 2 times daily for 5 days             FUTURE APPOINTMENTS:       - Follow up in 1 week for persistent symptoms, sooner for new or worsening symptoms.     See Patient Instructions    No follow-ups on file.    DELLA Calloway CNP  [facility]   Mariah is a 21 year old, presenting for the following health issues:  Covid Concern      HPI       COVID-19 Symptom Review  How many days ago did these symptoms start? 3 days, tested positive 2 days ago     Are any of the following symptoms significant for you?    New or worsening difficulty breathing? No    Worsening cough? Yes, it's a dry cough.     Fever or chills? Yes, I felt feverish or had chills.    Headache: YES    Sore throat: YES    Chest pain: YES    Diarrhea: YES    Body aches? YES    What treatments has patient tried? Acetaminophen and Nonsteroidals   Does patient live in a nursing home, group home, or shelter? no  Does patient have a way to get food/medications during quarantined? Yes, I have a friend or family member who can help me.          Review of Systems   Constitutional, HEENT, cardiovascular, pulmonary, gi and gu systems are negative, except as otherwise noted.      Objective           Vitals:  No vitals were obtained today due to virtual visit.    Physical Exam   healthy, alert and no distress  PSYCH: Alert and oriented times 3; coherent speech, normal   rate and volume, able to articulate logical thoughts, able   to abstract reason, no tangential thoughts, no hallucinations   or delusions  Her affect is normal and pleasant  RESP: No cough, no audible wheezing, able to  talk in full sentences  Remainder of exam unable to be completed due to telephone visits                Phone call duration: 8 minutes    .  ..

## 2022-07-05 ENCOUNTER — TRANSFERRED RECORDS (OUTPATIENT)
Dept: HEALTH INFORMATION MANAGEMENT | Facility: CLINIC | Age: 22
End: 2022-07-05

## 2022-09-29 ENCOUNTER — MYC MEDICAL ADVICE (OUTPATIENT)
Dept: PEDIATRICS | Facility: CLINIC | Age: 22
End: 2022-09-29

## 2022-09-29 NOTE — LETTER
[facility] [place]  [address]  [address]  [place] [address]  Phone: [phone]  Fax: [phone]    09/30/22    Mariah Arboleda  [address] [place] [address]  [place] [address]      To whom it may concern:     Mariah Arboleda is under my care and her health may benefit from having an animal in her home.    Sincerely,      Patricio Copeland MD

## 2022-09-29 NOTE — TELEPHONE ENCOUNTER
I don't certify for ESAs (this can generally be done w/ a therapist if she's interested).     If pt wants okay to write a letter saying she is under my care and her health may benefit from having an animal in her home. (sometimes this is good enough to avoid the fee)    RAE Copeland MD  Internal Medicine-Pediatrics

## 2022-11-21 ENCOUNTER — HEALTH MAINTENANCE LETTER (OUTPATIENT)
Age: 22
End: 2022-11-21

## 2023-04-20 ENCOUNTER — PATIENT OUTREACH (OUTPATIENT)
Dept: CARE COORDINATION | Facility: CLINIC | Age: 23
End: 2023-04-20
Payer: COMMERCIAL

## 2023-06-02 ENCOUNTER — HEALTH MAINTENANCE LETTER (OUTPATIENT)
Age: 23
End: 2023-06-02

## 2023-06-05 ENCOUNTER — MYC MEDICAL ADVICE (OUTPATIENT)
Dept: PEDIATRICS | Facility: CLINIC | Age: 23
End: 2023-06-05
Payer: COMMERCIAL

## 2023-06-05 NOTE — TELEPHONE ENCOUNTER
Patricio Copeland MD  Ea Triage 1 hour ago (12:39 PM)     EM  Would recommend evisit - that way I can order labs etc. Does she need a PPD or a quantiferon gold (blood test)?     RAE Copeland MD   Internal Medicine-Pediatrics

## 2023-06-27 ENCOUNTER — E-VISIT (OUTPATIENT)
Dept: PEDIATRICS | Facility: CLINIC | Age: 23
End: 2023-06-27
Payer: COMMERCIAL

## 2023-06-27 DIAGNOSIS — Z11.1 SCREENING FOR TUBERCULOSIS: Primary | ICD-10-CM

## 2023-06-27 PROCEDURE — 99207 PR NON-BILLABLE SERV PER CHARTING: CPT | Performed by: INTERNAL MEDICINE

## 2023-06-28 NOTE — PATIENT INSTRUCTIONS
Thank you for choosing us for your care. Given your symptoms, I would like you to do a lab-only visit to determine what is causing them.  I have placed the orders.  Please schedule an appointment with the lab right here in [email or web address], or call [phone].  I will let you know when the results are back and next steps to take.

## 2023-07-10 SDOH — ECONOMIC STABILITY: FOOD INSECURITY: WITHIN THE PAST 12 MONTHS, YOU WORRIED THAT YOUR FOOD WOULD RUN OUT BEFORE YOU GOT MONEY TO BUY MORE.: NEVER TRUE

## 2023-07-10 SDOH — ECONOMIC STABILITY: FOOD INSECURITY: WITHIN THE PAST 12 MONTHS, THE FOOD YOU BOUGHT JUST DIDN'T LAST AND YOU DIDN'T HAVE MONEY TO GET MORE.: NEVER TRUE

## 2023-07-10 SDOH — HEALTH STABILITY: PHYSICAL HEALTH: ON AVERAGE, HOW MANY MINUTES DO YOU ENGAGE IN EXERCISE AT THIS LEVEL?: 20 MIN

## 2023-07-10 SDOH — HEALTH STABILITY: PHYSICAL HEALTH: ON AVERAGE, HOW MANY DAYS PER WEEK DO YOU ENGAGE IN MODERATE TO STRENUOUS EXERCISE (LIKE A BRISK WALK)?: 2 DAYS

## 2023-07-10 SDOH — ECONOMIC STABILITY: INCOME INSECURITY: HOW HARD IS IT FOR YOU TO PAY FOR THE VERY BASICS LIKE FOOD, HOUSING, MEDICAL CARE, AND HEATING?: NOT HARD AT ALL

## 2023-07-10 SDOH — ECONOMIC STABILITY: INCOME INSECURITY: IN THE LAST 12 MONTHS, WAS THERE A TIME WHEN YOU WERE NOT ABLE TO PAY THE MORTGAGE OR RENT ON TIME?: NO

## 2023-07-10 ASSESSMENT — LIFESTYLE VARIABLES
SKIP TO QUESTIONS 9-10: 0
HOW OFTEN DO YOU HAVE A DRINK CONTAINING ALCOHOL: 2-4 TIMES A MONTH
HOW OFTEN DO YOU HAVE SIX OR MORE DRINKS ON ONE OCCASION: LESS THAN MONTHLY
AUDIT-C TOTAL SCORE: 3
HOW MANY STANDARD DRINKS CONTAINING ALCOHOL DO YOU HAVE ON A TYPICAL DAY: 1 OR 2

## 2023-07-10 ASSESSMENT — ENCOUNTER SYMPTOMS
CHILLS: 0
BREAST MASS: 0
FEVER: 0
WEAKNESS: 0
HEARTBURN: 0
MYALGIAS: 0
HEMATOCHEZIA: 0
PALPITATIONS: 0
COUGH: 0
EYE PAIN: 0
NAUSEA: 0
NERVOUS/ANXIOUS: 1
ARTHRALGIAS: 0
HEADACHES: 1
ABDOMINAL PAIN: 0
HEMATURIA: 0
FREQUENCY: 0
JOINT SWELLING: 0
DYSURIA: 0
PARESTHESIAS: 0
SORE THROAT: 0
DIZZINESS: 0
DIARRHEA: 1
CONSTIPATION: 0
SHORTNESS OF BREATH: 0

## 2023-07-10 ASSESSMENT — SOCIAL DETERMINANTS OF HEALTH (SDOH)
HOW OFTEN DO YOU GET TOGETHER WITH FRIENDS OR RELATIVES?: THREE TIMES A WEEK
HOW OFTEN DO YOU ATTEND CHURCH OR RELIGIOUS SERVICES?: 1 TO 4 TIMES PER YEAR
IN A TYPICAL WEEK, HOW MANY TIMES DO YOU TALK ON THE PHONE WITH FAMILY, FRIENDS, OR NEIGHBORS?: TWICE A WEEK
DO YOU BELONG TO ANY CLUBS OR ORGANIZATIONS SUCH AS CHURCH GROUPS UNIONS, FRATERNAL OR ATHLETIC GROUPS, OR SCHOOL GROUPS?: NO
ARE YOU MARRIED, WIDOWED, DIVORCED, SEPARATED, NEVER MARRIED, OR LIVING WITH A PARTNER?: NEVER MARRIED

## 2023-07-13 ENCOUNTER — LAB (OUTPATIENT)
Dept: LAB | Facility: CLINIC | Age: 23
End: 2023-07-13
Payer: COMMERCIAL

## 2023-07-13 DIAGNOSIS — Z11.1 SCREENING FOR TUBERCULOSIS: ICD-10-CM

## 2023-07-13 PROCEDURE — 36415 COLL VENOUS BLD VENIPUNCTURE: CPT

## 2023-07-13 PROCEDURE — 86481 TB AG RESPONSE T-CELL SUSP: CPT

## 2023-07-15 LAB
GAMMA INTERFERON BACKGROUND BLD IA-ACNC: 0.01 IU/ML
M TB IFN-G BLD-IMP: NEGATIVE
M TB IFN-G CD4+ BCKGRND COR BLD-ACNC: 2.88 IU/ML
MITOGEN IGNF BCKGRD COR BLD-ACNC: 0.01 IU/ML
MITOGEN IGNF BCKGRD COR BLD-ACNC: 0.02 IU/ML
QUANTIFERON MITOGEN: 2.89 IU/ML
QUANTIFERON NIL TUBE: 0.01 IU/ML
QUANTIFERON TB1 TUBE: 0.03 IU/ML
QUANTIFERON TB2 TUBE: 0.02

## 2023-07-17 ENCOUNTER — OFFICE VISIT (OUTPATIENT)
Dept: PEDIATRICS | Facility: CLINIC | Age: 23
End: 2023-07-17
Payer: COMMERCIAL

## 2023-07-17 VITALS
HEIGHT: 66 IN | WEIGHT: 245 LBS | DIASTOLIC BLOOD PRESSURE: 74 MMHG | TEMPERATURE: 98.3 F | SYSTOLIC BLOOD PRESSURE: 122 MMHG | BODY MASS INDEX: 39.37 KG/M2 | HEART RATE: 82 BPM | OXYGEN SATURATION: 98 % | RESPIRATION RATE: 16 BRPM

## 2023-07-17 DIAGNOSIS — F32.0 MILD MAJOR DEPRESSION (H): ICD-10-CM

## 2023-07-17 DIAGNOSIS — E66.09 CLASS 2 OBESITY DUE TO EXCESS CALORIES WITHOUT SERIOUS COMORBIDITY WITH BODY MASS INDEX (BMI) OF 39.0 TO 39.9 IN ADULT: ICD-10-CM

## 2023-07-17 DIAGNOSIS — Z11.3 SCREENING FOR STDS (SEXUALLY TRANSMITTED DISEASES): ICD-10-CM

## 2023-07-17 DIAGNOSIS — E66.812 CLASS 2 OBESITY DUE TO EXCESS CALORIES WITHOUT SERIOUS COMORBIDITY WITH BODY MASS INDEX (BMI) OF 39.0 TO 39.9 IN ADULT: ICD-10-CM

## 2023-07-17 DIAGNOSIS — Z00.00 ROUTINE GENERAL MEDICAL EXAMINATION AT A HEALTH CARE FACILITY: Primary | ICD-10-CM

## 2023-07-17 PROBLEM — F33.9 MAJOR DEPRESSION, RECURRENT (H): Status: ACTIVE | Noted: 2023-07-17

## 2023-07-17 LAB — HBA1C MFR BLD: 5.3 % (ref 0–5.6)

## 2023-07-17 PROCEDURE — 99395 PREV VISIT EST AGE 18-39: CPT | Performed by: NURSE PRACTITIONER

## 2023-07-17 PROCEDURE — 99213 OFFICE O/P EST LOW 20 MIN: CPT | Mod: 25 | Performed by: NURSE PRACTITIONER

## 2023-07-17 PROCEDURE — 36415 COLL VENOUS BLD VENIPUNCTURE: CPT | Performed by: NURSE PRACTITIONER

## 2023-07-17 PROCEDURE — 80053 COMPREHEN METABOLIC PANEL: CPT | Performed by: NURSE PRACTITIONER

## 2023-07-17 PROCEDURE — 86780 TREPONEMA PALLIDUM: CPT | Performed by: NURSE PRACTITIONER

## 2023-07-17 PROCEDURE — 86803 HEPATITIS C AB TEST: CPT | Performed by: NURSE PRACTITIONER

## 2023-07-17 PROCEDURE — 87591 N.GONORRHOEAE DNA AMP PROB: CPT | Performed by: NURSE PRACTITIONER

## 2023-07-17 PROCEDURE — 87389 HIV-1 AG W/HIV-1&-2 AB AG IA: CPT | Performed by: NURSE PRACTITIONER

## 2023-07-17 PROCEDURE — 80061 LIPID PANEL: CPT | Performed by: NURSE PRACTITIONER

## 2023-07-17 PROCEDURE — 83036 HEMOGLOBIN GLYCOSYLATED A1C: CPT | Performed by: NURSE PRACTITIONER

## 2023-07-17 PROCEDURE — 87491 CHLMYD TRACH DNA AMP PROBE: CPT | Performed by: NURSE PRACTITIONER

## 2023-07-17 RX ORDER — FLUOXETINE 10 MG/1
30 CAPSULE ORAL DAILY
Qty: 270 CAPSULE | Refills: 3 | Status: SHIPPED | OUTPATIENT
Start: 2023-07-17 | End: 2024-08-28

## 2023-07-17 ASSESSMENT — ENCOUNTER SYMPTOMS
ABDOMINAL PAIN: 0
SHORTNESS OF BREATH: 0
HEADACHES: 1
MYALGIAS: 0
HEARTBURN: 0
DYSURIA: 0
CONSTIPATION: 0
HEMATURIA: 0
CHILLS: 0
WEAKNESS: 0
EYE PAIN: 0
DIZZINESS: 0
NAUSEA: 0
FREQUENCY: 0
DIARRHEA: 1
JOINT SWELLING: 0
ARTHRALGIAS: 0
PALPITATIONS: 0
FEVER: 0
PARESTHESIAS: 0
SORE THROAT: 0
NERVOUS/ANXIOUS: 1
COUGH: 0
HEMATOCHEZIA: 0
BREAST MASS: 0

## 2023-07-17 ASSESSMENT — PATIENT HEALTH QUESTIONNAIRE - PHQ9
SUM OF ALL RESPONSES TO PHQ QUESTIONS 1-9: 12
SUM OF ALL RESPONSES TO PHQ QUESTIONS 1-9: 12
10. IF YOU CHECKED OFF ANY PROBLEMS, HOW DIFFICULT HAVE THESE PROBLEMS MADE IT FOR YOU TO DO YOUR WORK, TAKE CARE OF THINGS AT HOME, OR GET ALONG WITH OTHER PEOPLE: SOMEWHAT DIFFICULT

## 2023-07-17 ASSESSMENT — PAIN SCALES - GENERAL: PAINLEVEL: NO PAIN (0)

## 2023-07-17 NOTE — PROGRESS NOTES
SUBJECTIVE:   CC: Mariah is an 22 year old who presents for preventive health visit.       7/17/2023     2:18 PM   Additional Questions   Roomed by sherlyn   Accompanied by self         7/17/2023     2:18 PM   Patient Reported Additional Medications   Patient reports taking the following new medications no     Healthy Habits:     Getting at least 3 servings of Calcium per day:  NO    Bi-annual eye exam:  Yes    Dental care twice a year:  Yes    Sleep apnea or symptoms of sleep apnea:  Daytime drowsiness    Diet:  Regular (no restrictions)    Frequency of exercise:  2-3 days/week    Duration of exercise:  45-60 minutes    Taking medications regularly:  No    Barriers to taking medications:  Problems remembering to take them and Side effects    Medication side effects:  None    Additional concerns today:  No    Completed CNA training, 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     Lives in [place] with her parents.     Follows with a therapist bi-weekly. Had been following with psychiatry but they left so PCP refilled her meds last year. Hasn't been taking fluoxetine for a couple months now. Doesn't use hydroxyzine, not very effective and caused sleepiness.       Today's PHQ-9 Score:       7/17/2023     2:12 PM   PHQ-9 SCORE   PHQ-9 Total Score MyChart 12 (Moderate depression)   PHQ-9 Total Score 12       Social History     Tobacco Use     Smoking status: Never     Smokeless tobacco: Never   Substance Use Topics     Alcohol use: Yes           7/10/2023    12:37 PM   Alcohol Use   Prescreen: >3 drinks/day or >7 drinks/week? No     Reviewed orders with patient.  Reviewed health maintenance and updated orders accordingly - Yes      Breast Cancer Screening: Routine mammogram not recommended with age <40.    History of abnormal Pap smear: NO - age 21-29 PAP every 3 years recommended      4/5/2021     2:16 PM   PAP / HPV   PAP (Historical) NIL      Reviewed and updated as  needed this visit by clinical staff   Tobacco  Allergies  Meds              Reviewed and updated as needed this visit by Provider                 Patient Active Problem List   Diagnosis     Overweight (BMI 25.0-29.9)     Anxiety     Mild major depression (H)     Past Medical History:   Diagnosis Date     Pertussis 2016     Past Surgical History:   Procedure Laterality Date     HC TOOTH EXTRACTION W/FORCEP       PE TUBES       Family History   Problem Relation Age of Onset     Melanoma Mother      Obesity Mother      Cancer Father         testicular      Hyperlipidemia Father      Depression Father      Asthma Father      Obesity Father      Diabetes Type 2  Father      Lung Cancer Paternal Grandmother      Leukemia Maternal Aunt         in her 40s     Diabetes Maternal Aunt      Cancer Paternal Aunt         mouth      Leukemia Paternal Aunt         College age     Heart Disease Paternal Grandfather      Macular Degeneration Paternal Grandfather      Glaucoma Maternal Grandmother      Brain Tumor Maternal Grandfather      Diabetes Other      Diabetes Cousin      Other Cancer Other         Leukemia     Other Cancer Other      Depression Brother      Breast Cancer No family hx of      Colon Cancer No family hx of      Retinal detachment No family hx of      Social History     Socioeconomic History     Marital status: Single     Spouse name: Not on file     Number of children: Not on file     Years of education: Not on file     Highest education level: Not on file   Occupational History     Not on file   Tobacco Use     Smoking status: Never     Smokeless tobacco: Never   Vaping Use     Vaping Use: Never used   Substance and Sexual Activity     Alcohol use: Yes     Drug use: No     Sexual activity: Yes     Partners: Male     Birth control/protection: Pull-out method, I.U.D.     Comment: 8/2018 never sexually active   Other Topics Concern     Parent/sibling w/ CABG, MI or angioplasty before 65F 55M? No   Social  History Narrative    8/2018    Just graduated from [employer], going to the .     Interested in theatre.        Lives with parents and older brother.        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    Student at the , off campus apt     Social Determinants of Health     Financial Resource Strain: Low Risk  (7/10/2023)    Overall Financial Resource Strain (CARDIA)      Difficulty of Paying Living Expenses: Not hard at all   Food Insecurity: No Food Insecurity (7/10/2023)    Hunger Vital Sign      Worried About Running Out of Food in the Last Year: Never true      Ran Out of Food in the Last Year: Never true   Transportation Needs: No Transportation Needs (7/10/2023)    PRAPARE - Transportation      Lack of Transportation (Medical): No      Lack of Transportation (Non-Medical): No   Physical Activity: Insufficiently Active (7/10/2023)    Exercise Vital Sign      Days of Exercise per Week: 2 days      Minutes of Exercise per Session: 20 min   Stress: Stress Concern Present (7/10/2023)    [employer] [place] [employer] - Occupational Stress Questionnaire      Feeling of Stress : To some extent   Social Connections: Moderately Isolated (7/10/2023)    Social Connection and Isolation Panel [NHANES]      Frequency of Communication with Friends and Family: Twice a week      Frequency of Social Gatherings with Friends and Family: Three times a week      Attends Mandaeism Services: 1 to 4 times per year      Active Member of Clubs or Organizations: No      Attends Club or Organization Meetings: Not on file      Marital Status: Never    Intimate Partner Violence: Not on file   Housing Stability: Low Risk  (7/10/2023)    Housing Stability Vital Sign      Unable to Pay for Housing in the Last Year: No      Number of Places Lived in the Last Year: 2      Unstable Housing in the  "Last Year: No     Current Outpatient Medications   Medication     FLUoxetine (PROZAC) 10 MG capsule     hydrOXYzine (ATARAX) 10 MG tablet     levonorgestrel (MIRENA) 20 MCG/24HR IUD     Vitamin D, Cholecalciferol, 25 MCG (1000 UT) TABS     No current facility-administered medications for this visit.        Allergies   Allergen Reactions     Nystatin Nausea and Vomiting         Review of Systems   Constitutional: Negative for chills and fever.   HENT: Negative for congestion, ear pain, hearing loss and sore throat.    Eyes: Negative for pain and visual disturbance.   Respiratory: Negative for cough and shortness of breath.    Cardiovascular: Negative for chest pain, palpitations and peripheral edema.   Gastrointestinal: Positive for diarrhea. Negative for abdominal pain, constipation, heartburn, hematochezia and nausea.   Breasts:  Negative for tenderness, breast mass and discharge.   Genitourinary: Negative for dysuria, frequency, genital sores, hematuria, pelvic pain, urgency, vaginal bleeding and vaginal discharge.   Musculoskeletal: Negative for arthralgias, joint swelling and myalgias.   Skin: Negative for rash.   Neurological: Positive for headaches. Negative for dizziness, weakness and paresthesias.   Psychiatric/Behavioral: Negative for mood changes. The patient is nervous/anxious.      OBJECTIVE:   /74 (Cuff Size: Adult Large)   Pulse 82   Temp 98.3  F (36.8  C) (Tympanic)   Resp 16   Ht 1.676 m (5' 6\")   Wt 111.1 kg (245 lb)   SpO2 98%   BMI 39.54 kg/m    Physical Exam  Constitutional: appears to be in no acute distress, comfortable, pleasant.   Eyes: anicteric, conjunctiva clear without drainage or erythema. BRIONNA.   Ears, Nose and Throat: tympanic membranes gray with LR,  nose without nasal discharge. OP: no erythema to posterior pharynx, negative post nasal drainage, tonsils +1 no erythema or exudate.  Neck: supple, thyroid palpable,not enlarged, no nodules   Breast: Exam deferred (deferred " after discussion of exam options with patient, no symptoms or concerns).   Cardiovascular: regular rate and rhythm, normal S1 and S2, no murmurs, rubs or gallops, peripheral pulses full and symmetric; negative peripheral edema   Respiratory: Air entry throughout. Breathing pattern unlabored without the use of accessory muscles. Clear to auscultation A and P, no wheezes or crackles, normal breath sounds.    Gastrointestinal: rounded, soft. Positive bowel sounds x4, nontender, no masses.   Genitourinary: Exam deferred (deferred after discussion of exam options with patient, no symptoms or concerns, pap up to date).   Musculoskeletal: full range of motion, no edema.   Skin: pink, turgor smooth and elastic. Negative for lesions or dryness.  Neurological: normal gait, no tremor.   Psychological: appropriate mood and affect.   Lymphatic: no cervical, axillary, supraclavicular, or infraclavicular lymphadenopathy.    Diagnostic Test Results:  Labs reviewed in Epic    ASSESSMENT/PLAN:   (Z00.00) Routine general medical examination at a health care facility  (primary encounter diagnosis)  Age appropriate screening and preventative care have been addressed today. Vaccinations have been reviewed and are up to date. Patient has been advised to undertake routine aerobic activity. Recommend annual vision exams as well as biannual dental exams. They will follow up for annual physical again in one year.     (E66.09,  Z68.39) Class 2 obesity due to excess calories without serious comorbidity with body mass index (BMI) of 39.0 to 39.9 in adult  Body mass index is 39.54 kg/m . Enjoys yoga, recently re-instated her gym membership and is working on getting there more regularly.  - Hemoglobin A1c  - Lipid panel reflex to direct LDL Fasting  - Comprehensive metabolic panel (BMP + Alb, Alk Phos, ALT, AST, Total. Bili, TP)          (F32.0) Mild major depression (H)  Chronic, stable. Has been off fluoxetine for a few months but would like to  restart it. Refills provided and discussed how to up-titrate the medication to her prior dose of 30 mg daily. She will provide an update on how things are going in 6-8 weeks over mychart.   - FLUoxetine (PROZAC) 10 MG capsule  - OFFICE/OUTPT VISIT,ALY BELLE III         (Z11.3) Screening for STDs (sexually transmitted diseases)  - NEISSERIA GONORRHOEA PCR  - CHLAMYDIA TRACHOMATIS PCR  - Treponema Abs w Reflex to RPR and Titer  - Hepatitis C Screen Reflex to HCV RNA Quant and Genotype  - HIV Antigen Antibody Combo          Follow-up: Lab results pending, will follow-up as indicated after reviewing results.       COUNSELING:  Reviewed preventive health counseling, as reflected in patient instructions  Special attention given to:        Regular exercise       Vision screening       Immunizations       Contraception       Safe sex practices/STD prevention       Consider Hep C screening for all patients one time for ages 18-79 years       Syphilis screening for high risk patients        HIV screeninx in teen years, 1x in adult years, and at intervals if high risk    She reports that she has never smoked. She has never used smokeless tobacco.             DELLA Roth CNP  [facility] [place]

## 2023-07-18 LAB
ALBUMIN SERPL BCG-MCNC: 4.9 G/DL (ref 3.5–5.2)
ALP SERPL-CCNC: 73 U/L (ref 35–104)
ALT SERPL W P-5'-P-CCNC: 17 U/L (ref 0–50)
ANION GAP SERPL CALCULATED.3IONS-SCNC: 14 MMOL/L (ref 7–15)
AST SERPL W P-5'-P-CCNC: 28 U/L (ref 0–45)
BILIRUB SERPL-MCNC: 0.5 MG/DL
BUN SERPL-MCNC: 10.6 MG/DL (ref 6–20)
CALCIUM SERPL-MCNC: 9.9 MG/DL (ref 8.6–10)
CHLORIDE SERPL-SCNC: 101 MMOL/L (ref 98–107)
CHOLEST SERPL-MCNC: 183 MG/DL
CREAT SERPL-MCNC: 0.75 MG/DL (ref 0.51–0.95)
DEPRECATED HCO3 PLAS-SCNC: 23 MMOL/L (ref 22–29)
GFR SERPL CREATININE-BSD FRML MDRD: >90 ML/MIN/1.73M2
GLUCOSE SERPL-MCNC: 82 MG/DL (ref 70–99)
HCV AB SERPL QL IA: NONREACTIVE
HDLC SERPL-MCNC: 47 MG/DL
HIV 1+2 AB+HIV1 P24 AG SERPL QL IA: NONREACTIVE
LDLC SERPL CALC-MCNC: 119 MG/DL
NONHDLC SERPL-MCNC: 136 MG/DL
POTASSIUM SERPL-SCNC: 4.5 MMOL/L (ref 3.4–5.3)
PROT SERPL-MCNC: 7.9 G/DL (ref 6.4–8.3)
SODIUM SERPL-SCNC: 138 MMOL/L (ref 136–145)
T PALLIDUM AB SER QL: NONREACTIVE
TRIGL SERPL-MCNC: 85 MG/DL

## 2023-07-19 LAB
C TRACH DNA SPEC QL NAA+PROBE: NEGATIVE
N GONORRHOEA DNA SPEC QL NAA+PROBE: NEGATIVE

## 2024-08-27 SDOH — HEALTH STABILITY: PHYSICAL HEALTH: ON AVERAGE, HOW MANY DAYS PER WEEK DO YOU ENGAGE IN MODERATE TO STRENUOUS EXERCISE (LIKE A BRISK WALK)?: 2 DAYS

## 2024-08-27 SDOH — HEALTH STABILITY: PHYSICAL HEALTH: ON AVERAGE, HOW MANY MINUTES DO YOU ENGAGE IN EXERCISE AT THIS LEVEL?: 20 MIN

## 2024-08-27 ASSESSMENT — PATIENT HEALTH QUESTIONNAIRE - PHQ9
SUM OF ALL RESPONSES TO PHQ QUESTIONS 1-9: 9
SUM OF ALL RESPONSES TO PHQ QUESTIONS 1-9: 9
10. IF YOU CHECKED OFF ANY PROBLEMS, HOW DIFFICULT HAVE THESE PROBLEMS MADE IT FOR YOU TO DO YOUR WORK, TAKE CARE OF THINGS AT HOME, OR GET ALONG WITH OTHER PEOPLE: SOMEWHAT DIFFICULT

## 2024-08-27 ASSESSMENT — SOCIAL DETERMINANTS OF HEALTH (SDOH): HOW OFTEN DO YOU GET TOGETHER WITH FRIENDS OR RELATIVES?: TWICE A WEEK

## 2024-08-28 ENCOUNTER — OFFICE VISIT (OUTPATIENT)
Dept: PEDIATRICS | Facility: CLINIC | Age: 24
End: 2024-08-28
Payer: COMMERCIAL

## 2024-08-28 VITALS
HEART RATE: 84 BPM | HEIGHT: 66 IN | DIASTOLIC BLOOD PRESSURE: 79 MMHG | RESPIRATION RATE: 16 BRPM | SYSTOLIC BLOOD PRESSURE: 118 MMHG | TEMPERATURE: 98.2 F | WEIGHT: 251 LBS | OXYGEN SATURATION: 98 % | BODY MASS INDEX: 40.34 KG/M2

## 2024-08-28 DIAGNOSIS — E66.813 CLASS 3 SEVERE OBESITY DUE TO EXCESS CALORIES WITHOUT SERIOUS COMORBIDITY WITH BODY MASS INDEX (BMI) OF 40.0 TO 44.9 IN ADULT (H): ICD-10-CM

## 2024-08-28 DIAGNOSIS — Z11.3 SCREENING FOR STDS (SEXUALLY TRANSMITTED DISEASES): ICD-10-CM

## 2024-08-28 DIAGNOSIS — Z12.4 CERVICAL CANCER SCREENING: ICD-10-CM

## 2024-08-28 DIAGNOSIS — K21.9 GASTROESOPHAGEAL REFLUX DISEASE, UNSPECIFIED WHETHER ESOPHAGITIS PRESENT: ICD-10-CM

## 2024-08-28 DIAGNOSIS — Z00.00 ENCOUNTER FOR PREVENTATIVE ADULT HEALTH CARE EXAMINATION: Primary | ICD-10-CM

## 2024-08-28 DIAGNOSIS — E66.01 CLASS 3 SEVERE OBESITY DUE TO EXCESS CALORIES WITHOUT SERIOUS COMORBIDITY WITH BODY MASS INDEX (BMI) OF 40.0 TO 44.9 IN ADULT (H): ICD-10-CM

## 2024-08-28 DIAGNOSIS — F33.9 RECURRENT MAJOR DEPRESSIVE DISORDER, REMISSION STATUS UNSPECIFIED (H): ICD-10-CM

## 2024-08-28 DIAGNOSIS — F41.9 ANXIETY: ICD-10-CM

## 2024-08-28 LAB
HBA1C MFR BLD: 5.1 % (ref 0–5.6)
T PALLIDUM AB SER QL: NONREACTIVE

## 2024-08-28 PROCEDURE — 86803 HEPATITIS C AB TEST: CPT | Performed by: NURSE PRACTITIONER

## 2024-08-28 PROCEDURE — 80053 COMPREHEN METABOLIC PANEL: CPT | Performed by: NURSE PRACTITIONER

## 2024-08-28 PROCEDURE — 99395 PREV VISIT EST AGE 18-39: CPT | Performed by: NURSE PRACTITIONER

## 2024-08-28 PROCEDURE — 80061 LIPID PANEL: CPT | Performed by: NURSE PRACTITIONER

## 2024-08-28 PROCEDURE — 83036 HEMOGLOBIN GLYCOSYLATED A1C: CPT | Performed by: NURSE PRACTITIONER

## 2024-08-28 PROCEDURE — 87491 CHLMYD TRACH DNA AMP PROBE: CPT | Performed by: NURSE PRACTITIONER

## 2024-08-28 PROCEDURE — 87591 N.GONORRHOEAE DNA AMP PROB: CPT | Performed by: NURSE PRACTITIONER

## 2024-08-28 PROCEDURE — 87389 HIV-1 AG W/HIV-1&-2 AB AG IA: CPT | Performed by: NURSE PRACTITIONER

## 2024-08-28 PROCEDURE — 36415 COLL VENOUS BLD VENIPUNCTURE: CPT | Performed by: NURSE PRACTITIONER

## 2024-08-28 PROCEDURE — G0145 SCR C/V CYTO,THINLAYER,RESCR: HCPCS | Performed by: NURSE PRACTITIONER

## 2024-08-28 PROCEDURE — 86780 TREPONEMA PALLIDUM: CPT | Performed by: NURSE PRACTITIONER

## 2024-08-28 ASSESSMENT — PAIN SCALES - GENERAL: PAINLEVEL: NO PAIN (0)

## 2024-08-28 NOTE — PROGRESS NOTES
Preventive Care Visit  [facility] DELLA Murrell CNP, Family Medicine  Aug 28, 2024      Assessment & Plan     Encounter for preventative adult health care examination  Age appropriate screening and preventative care have been addressed today. Vaccinations have been reviewed. Patient has been advised to follow a balanced diet. They have been advised to undertake routine aerobic activity. Recommend annual vision exams as well as biannual dental exams. They will follow up for annual physical again in one year.     Cervical cancer screening  - Pap Screen Only - Recommended Age 21 - 24 Years    Screening for STDs (sexually transmitted diseases)  Asymptomatic, no known exposures.   - Chlamydia trachomatis/Neisseria gonorrhoeae by PCR - Clinic Collect  - Treponema Abs w Reflex to RPR and Titer  - HIV Antigen Antibody Combo  - Hepatitis C Screen Reflex to HCV RNA Quant and Genotype    Class 3 severe obesity due to excess calories without serious comorbidity with body mass index (BMI) of 40.0 to 44.9 in adult (H)  Body mass index is 40.1 kg/m . 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 Living with her parents, who tend to regularly eat out and for this reason she has been eating out more than prior as well. Not engaging in regular physical exercise at this time. We discussed healthy lifestyle.   - Lipid panel reflex to direct LDL Fasting  - Hemoglobin A1c  - Comprehensive metabolic panel (BMP + Alb, Alk Phos, ALT, AST, Total. Bili, TP)    Recurrent major depressive disorder, remission status unspecified (H24)  Anxiety  Doing well off fluoxetine. No concerns. Continue to monitor.     Gastroesophageal reflux disease, unspecified whether esophagitis present  She is aware of dietary triggers. Reviewed other preventative lifestyle measures. She can try pepid 20 mg BID as needed, would prefer to avoid PPI if possible. If no improvement with H2 blocker, she will follow-up.         BMI  Estimated  "body mass index is 40.1 kg/m  as calculated from the following:    Height as of this encounter: 1.685 m (5' 6.34\").    Weight as of this encounter: 113.9 kg (251 lb).   Weight management plan: Discussed healthy diet and exercise guidelines    Counseling  Appropriate preventive services were addressed with this patient via screening, questionnaire, or discussion as appropriate for fall prevention, nutrition, physical activity, Tobacco-use cessation, social engagement, weight loss and cognition.  Checklist reviewing preventive services available has been given to the patient.  Reviewed patient's diet, addressing concerns and/or questions.   She is at risk for lack of exercise and has been provided with information to increase physical activity for the benefit of her well-being.   The patient's PHQ-9 score is consistent with mild depression. She was provided with information regarding depression.         Brenton Lemus is a 23 year old, presenting for the following:  Physical        8/28/2024    10:10 AM   Additional Questions   Roomed by Namrata MONTES   Accompanied by None         8/28/2024    10:10 AM   Patient Reported Additional Medications   Patient reports taking the following new medications None          HPI    GERD. Using tums, works only sometimes. Tried omeprazole x 14 days. When symptoms present, it's random.     Had been having daily headaches, this is now improved. She tried to use ibuprofen, only helps sometimes. Sees marty with some regularity, she thinks this also helps.     Mirena placed 1/2020. Occasional spotting.         8/27/2024   General Health   How would you rate your overall physical health? (!) FAIR   Feel stress (tense, anxious, or unable to sleep) Very much      (!) STRESS CONCERN      8/27/2024   Nutrition   Three or more servings of calcium each day? Yes   Diet: Regular (no restrictions)   How many servings of fruit and vegetables per day? (!) 0-1   How many sweetened beverages each " day? 0-1            8/27/2024   Exercise   Days per week of moderate/strenous exercise 2 days   Average minutes spent exercising at this level 20 min      (!) EXERCISE CONCERN      8/27/2024   Social Factors   Frequency of gathering with friends or relatives Twice a week   Worry food won't last until get money to buy more No   Food not last or not have enough money for food? No   Do you have housing? (Housing is defined as stable permanent housing and does not include staying ouside in a car, in a tent, in an abandoned building, in an overnight shelter, or couch-surfing.) Yes   Are you worried about losing your housing? No   Lack of transportation? No   Unable to get utilities (heat,electricity)? No            8/27/2024   Dental   Dentist two times every year? Yes             Today's PHQ-9 Score:       8/27/2024    10:48 AM   PHQ-9 SCORE   PHQ-9 Total Score MyChart 9 (Mild depression)   PHQ-9 Total Score 9         8/27/2024   Substance Use   Alcohol more than 3/day or more than 7/wk No   Do you use any other substances recreationally? No        Social History     Tobacco Use    Smoking status: Never    Smokeless tobacco: Never   Vaping Use    Vaping status: Never Used   Substance Use Topics    Alcohol use: Yes    Drug use: No           8/27/2024   STI Screening   New sexual partner(s) since last STI/HIV test? (!) YES       History of abnormal Pap smear: No - age 21-29 PAP every 3 years recommended        4/5/2021     2:16 PM   PAP / HPV   PAP (Historical) NIL            8/27/2024   Contraception/Family Planning   Questions about contraception or family planning (!) YES            Reviewed and updated as needed this visit by Provider                    Patient Active Problem List   Diagnosis    Overweight (BMI 25.0-29.9)    Anxiety    Mild major depression (H)    Major depression, recurrent (H24)     Past Surgical History:   Procedure Laterality Date    HC TOOTH EXTRACTION W/FORCEP      PE TUBES         Social  "History     Tobacco Use    Smoking status: Never    Smokeless tobacco: Never   Substance Use Topics    Alcohol use: Yes     Family History   Problem Relation Age of Onset    Melanoma Mother     Obesity Mother     Cancer Father         testicular     Hyperlipidemia Father     Depression Father     Asthma Father     Obesity Father     Diabetes Type 2  Father     Lung Cancer Paternal Grandmother     Leukemia Maternal Aunt         in her 40s    Diabetes Maternal Aunt     Cancer Paternal Aunt         [place]     Leukemia Paternal Aunt         College age    Heart Disease Paternal Grandfather     Macular Degeneration Paternal Grandfather     Glaucoma Maternal Grandmother     Brain Tumor Maternal Grandfather     Diabetes Other     Diabetes Cousin     Other Cancer Other         Leukemia    Other Cancer Other     Depression Brother     Breast Cancer No family hx of     Colon Cancer No family hx of     Retinal detachment No family hx of               Objective    Exam  /79 (BP Location: Right arm, Patient Position: Sitting, Cuff Size: Adult Large)   Pulse 84   Temp 98.2  F (36.8  C) (Tympanic)   Resp 16   Ht 1.685 m (5' 6.34\")   Wt 113.9 kg (251 lb)   LMP  (LMP Unknown)   SpO2 98%   BMI 40.10 kg/m     Estimated body mass index is 40.1 kg/m  as calculated from the following:    Height as of this encounter: 1.685 m (5' 6.34\").    Weight as of this encounter: 113.9 kg (251 lb).    Physical Exam  Constitutional: appears to be in no acute distress, comfortable, pleasant.   Eyes: anicteric, conjunctiva clear without drainage or erythema. BRIONNA.   Ears, Nose and Throat: tympanic membranes gray with LR,  nose without nasal discharge. OP: no erythema to posterior pharynx, negative post nasal drainage, tonsils +1 no erythema or exudate.  Neck: supple, thyroid palpable,not enlarged, no nodules   Breast: Exam deferred (deferred after discussion of exam options with patient, no symptoms or concerns).   Cardiovascular: regular " rate and rhythm, normal S1 and S2, no murmurs, rubs or gallops, peripheral pulses full and symmetric; negative peripheral edema   Respiratory: Air entry throughout. Breathing pattern unlabored without the use of accessory muscles. Clear to auscultation A and P, no wheezes or crackles, normal breath sounds.    Gastrointestinal: rounded, soft. Positive bowel sounds x4, nontender, no masses.   Genitourinary: external genitalia is without lesions. Introitus is normal, vaginal walls pink and moist without lesions or evidence of trauma. There is no abnormal discharge from the cervix. Cervix is pink without polyps or lesions.  Musculoskeletal: full range of motion, no edema.   Skin: pink, turgor smooth and elastic. Negative for lesions or dryness.  Neurological: normal gait, no tremor.   Psychological: appropriate mood and affect.   Lymphatic: no cervical, axillary, supraclavicular, or infraclavicular lymphadenopathy.          Signed Electronically by: DELLA Roth CNP    Answers submitted by the patient for this visit:  Patient Health Questionnaire (Submitted on 8/27/2024)  If you checked off any problems, how difficult have these problems made it for you to do your work, take care of things at home, or get along with other people?: Somewhat difficult  PHQ9 TOTAL SCORE: 9

## 2024-08-29 LAB
ALBUMIN SERPL BCG-MCNC: 4.5 G/DL (ref 3.5–5.2)
ALP SERPL-CCNC: 74 U/L (ref 40–150)
ALT SERPL W P-5'-P-CCNC: 18 U/L (ref 0–50)
ANION GAP SERPL CALCULATED.3IONS-SCNC: 13 MMOL/L (ref 7–15)
AST SERPL W P-5'-P-CCNC: 26 U/L (ref 0–45)
BILIRUB SERPL-MCNC: 0.4 MG/DL
BUN SERPL-MCNC: 11.2 MG/DL (ref 6–20)
C TRACH DNA SPEC QL PROBE+SIG AMP: NEGATIVE
CALCIUM SERPL-MCNC: 9.5 MG/DL (ref 8.8–10.4)
CHLORIDE SERPL-SCNC: 104 MMOL/L (ref 98–107)
CHOLEST SERPL-MCNC: 196 MG/DL
CREAT SERPL-MCNC: 0.79 MG/DL (ref 0.51–0.95)
EGFRCR SERPLBLD CKD-EPI 2021: >90 ML/MIN/1.73M2
FASTING STATUS PATIENT QL REPORTED: YES
FASTING STATUS PATIENT QL REPORTED: YES
GLUCOSE SERPL-MCNC: 86 MG/DL (ref 70–99)
HCO3 SERPL-SCNC: 22 MMOL/L (ref 22–29)
HCV AB SERPL QL IA: NONREACTIVE
HDLC SERPL-MCNC: 48 MG/DL
HIV 1+2 AB+HIV1 P24 AG SERPL QL IA: NONREACTIVE
LDLC SERPL CALC-MCNC: 128 MG/DL
N GONORRHOEA DNA SPEC QL NAA+PROBE: NEGATIVE
NONHDLC SERPL-MCNC: 148 MG/DL
POTASSIUM SERPL-SCNC: 4.3 MMOL/L (ref 3.4–5.3)
PROT SERPL-MCNC: 7.5 G/DL (ref 6.4–8.3)
SODIUM SERPL-SCNC: 139 MMOL/L (ref 135–145)
TRIGL SERPL-MCNC: 100 MG/DL

## 2024-09-03 LAB
BKR LAB AP GYN ADEQUACY: NORMAL
BKR LAB AP GYN INTERPRETATION: NORMAL
BKR LAB AP HPV REFLEX: NO
BKR LAB AP PREVIOUS ABNORMAL: NORMAL
PATH REPORT.COMMENTS IMP SPEC: NORMAL
PATH REPORT.COMMENTS IMP SPEC: NORMAL
PATH REPORT.RELEVANT HX SPEC: NORMAL

## 2024-11-23 ENCOUNTER — OFFICE VISIT (OUTPATIENT)
Dept: URGENT CARE | Facility: URGENT CARE | Age: 24
End: 2024-11-23
Payer: COMMERCIAL

## 2024-11-23 ENCOUNTER — ANCILLARY PROCEDURE (OUTPATIENT)
Dept: GENERAL RADIOLOGY | Facility: CLINIC | Age: 24
End: 2024-11-23
Attending: PREVENTIVE MEDICINE
Payer: COMMERCIAL

## 2024-11-23 VITALS
OXYGEN SATURATION: 96 % | BODY MASS INDEX: 39.62 KG/M2 | HEART RATE: 85 BPM | DIASTOLIC BLOOD PRESSURE: 86 MMHG | SYSTOLIC BLOOD PRESSURE: 150 MMHG | TEMPERATURE: 99 F | RESPIRATION RATE: 20 BRPM | WEIGHT: 248 LBS

## 2024-11-23 DIAGNOSIS — R11.0 NAUSEA: Primary | ICD-10-CM

## 2024-11-23 DIAGNOSIS — R11.0 NAUSEA: ICD-10-CM

## 2024-11-23 DIAGNOSIS — R10.12 ABDOMINAL PAIN, LEFT UPPER QUADRANT: ICD-10-CM

## 2024-11-23 DIAGNOSIS — N39.0 URINARY TRACT INFECTION WITHOUT HEMATURIA, SITE UNSPECIFIED: ICD-10-CM

## 2024-11-23 LAB
ALBUMIN SERPL BCG-MCNC: 4.5 G/DL (ref 3.5–5.2)
ALBUMIN UR-MCNC: NEGATIVE MG/DL
ALP SERPL-CCNC: 68 U/L (ref 40–150)
ALT SERPL W P-5'-P-CCNC: 16 U/L (ref 0–50)
ANION GAP SERPL CALCULATED.3IONS-SCNC: 11 MMOL/L (ref 7–15)
APPEARANCE UR: CLEAR
AST SERPL W P-5'-P-CCNC: 23 U/L (ref 0–45)
BACTERIA #/AREA URNS HPF: ABNORMAL /HPF
BASOPHILS # BLD AUTO: 0.1 10E3/UL (ref 0–0.2)
BASOPHILS NFR BLD AUTO: 1 %
BILIRUB SERPL-MCNC: 0.5 MG/DL
BILIRUB UR QL STRIP: NEGATIVE
BUN SERPL-MCNC: 11.3 MG/DL (ref 6–20)
CALCIUM SERPL-MCNC: 9.8 MG/DL (ref 8.8–10.4)
CHLORIDE SERPL-SCNC: 105 MMOL/L (ref 98–107)
COLOR UR AUTO: YELLOW
CREAT SERPL-MCNC: 0.85 MG/DL (ref 0.51–0.95)
EGFRCR SERPLBLD CKD-EPI 2021: >90 ML/MIN/1.73M2
EOSINOPHIL # BLD AUTO: 0.2 10E3/UL (ref 0–0.7)
EOSINOPHIL NFR BLD AUTO: 1 %
ERYTHROCYTE [DISTWIDTH] IN BLOOD BY AUTOMATED COUNT: 12.4 % (ref 10–15)
GLUCOSE SERPL-MCNC: 106 MG/DL (ref 70–99)
GLUCOSE UR STRIP-MCNC: NEGATIVE MG/DL
HCG UR QL: NEGATIVE
HCO3 SERPL-SCNC: 24 MMOL/L (ref 22–29)
HCT VFR BLD AUTO: 42.3 % (ref 35–47)
HGB BLD-MCNC: 14.3 G/DL (ref 11.7–15.7)
HGB UR QL STRIP: ABNORMAL
IMM GRANULOCYTES # BLD: 0 10E3/UL
IMM GRANULOCYTES NFR BLD: 0 %
KETONES UR STRIP-MCNC: >=160 MG/DL
LEUKOCYTE ESTERASE UR QL STRIP: ABNORMAL
LIPASE SERPL-CCNC: 13 U/L (ref 13–60)
LYMPHOCYTES # BLD AUTO: 1.7 10E3/UL (ref 0.8–5.3)
LYMPHOCYTES NFR BLD AUTO: 16 %
MCH RBC QN AUTO: 29.2 PG (ref 26.5–33)
MCHC RBC AUTO-ENTMCNC: 33.8 G/DL (ref 31.5–36.5)
MCV RBC AUTO: 86 FL (ref 78–100)
MONOCYTES # BLD AUTO: 0.6 10E3/UL (ref 0–1.3)
MONOCYTES NFR BLD AUTO: 5 %
NEUTROPHILS # BLD AUTO: 8.2 10E3/UL (ref 1.6–8.3)
NEUTROPHILS NFR BLD AUTO: 77 %
NITRATE UR QL: NEGATIVE
PH UR STRIP: 6 [PH] (ref 5–7)
PLATELET # BLD AUTO: 422 10E3/UL (ref 150–450)
POTASSIUM SERPL-SCNC: 4.4 MMOL/L (ref 3.4–5.3)
PROT SERPL-MCNC: 7.8 G/DL (ref 6.4–8.3)
RBC # BLD AUTO: 4.9 10E6/UL (ref 3.8–5.2)
RBC #/AREA URNS AUTO: ABNORMAL /HPF
SODIUM SERPL-SCNC: 140 MMOL/L (ref 135–145)
SP GR UR STRIP: 1.02 (ref 1–1.03)
SQUAMOUS #/AREA URNS AUTO: ABNORMAL /LPF
UROBILINOGEN UR STRIP-ACNC: 0.2 E.U./DL
WBC # BLD AUTO: 10.7 10E3/UL (ref 4–11)
WBC #/AREA URNS AUTO: ABNORMAL /HPF

## 2024-11-23 PROCEDURE — 99215 OFFICE O/P EST HI 40 MIN: CPT | Performed by: PREVENTIVE MEDICINE

## 2024-11-23 PROCEDURE — 83690 ASSAY OF LIPASE: CPT | Performed by: PREVENTIVE MEDICINE

## 2024-11-23 PROCEDURE — 81025 URINE PREGNANCY TEST: CPT | Performed by: PREVENTIVE MEDICINE

## 2024-11-23 PROCEDURE — 74019 RADEX ABDOMEN 2 VIEWS: CPT | Mod: TC | Performed by: RADIOLOGY

## 2024-11-23 PROCEDURE — 85025 COMPLETE CBC W/AUTO DIFF WBC: CPT | Performed by: PREVENTIVE MEDICINE

## 2024-11-23 PROCEDURE — 36415 COLL VENOUS BLD VENIPUNCTURE: CPT | Performed by: PREVENTIVE MEDICINE

## 2024-11-23 PROCEDURE — 81001 URINALYSIS AUTO W/SCOPE: CPT | Performed by: PREVENTIVE MEDICINE

## 2024-11-23 PROCEDURE — 87086 URINE CULTURE/COLONY COUNT: CPT | Performed by: PREVENTIVE MEDICINE

## 2024-11-23 PROCEDURE — 80053 COMPREHEN METABOLIC PANEL: CPT | Performed by: PREVENTIVE MEDICINE

## 2024-11-23 RX ORDER — CEFPODOXIME PROXETIL 200 MG/1
200 TABLET, FILM COATED ORAL 2 TIMES DAILY
Qty: 14 TABLET | Refills: 0 | Status: SHIPPED | OUTPATIENT
Start: 2024-11-23 | End: 2024-11-30

## 2024-11-23 RX ORDER — ONDANSETRON 4 MG/1
4 TABLET, ORALLY DISINTEGRATING ORAL EVERY 8 HOURS PRN
Qty: 20 TABLET | Refills: 0 | Status: SHIPPED | OUTPATIENT
Start: 2024-11-23

## 2024-11-23 RX ORDER — NITROFURANTOIN 25; 75 MG/1; MG/1
100 CAPSULE ORAL 2 TIMES DAILY
Qty: 10 CAPSULE | Refills: 0 | Status: SHIPPED | OUTPATIENT
Start: 2024-11-23 | End: 2024-11-23

## 2024-11-23 NOTE — PROGRESS NOTES
Assessment & Plan     (R10.12) Abdominal pain, left upper quadrant  (R11.0) Nausea  (primary encounter diagnosis)  Plan: UA Macroscopic with reflex to Microscopic and         Culture - Clinic Collect, HCG qualitative         urine, UA Microscopic with Reflex to Culture,         CBC with platelets and differential,         Comprehensive metabolic panel, Lipase, XR         Abdomen 2 Views  (N39.0) Urinary tract infection without hematuria, site unspecified    Will treat for uti - vantin 200 mg two times per day for 7 days  U cx pending  Zofran as needed    Follow up in 2-3 days for recheck, sooner as needed       41 minutes spent by me on the date of the encounter doing chart review, history and exam, documentation and further activities per the note        No follow-ups on file.    Jose Daniel Beasley MD  [facility]    Subjective     Mariah Arboleda is a 23 year old year old female who presents to clinic today for the following health issues:    Patient presents with:  Urgent Care: Pt present with nausea, vaginal spotting/bleeding, mild abdominal pain, headache, onset 2 days.     This is a 24 yo female who presents nausea, abdominal cramping, achiness and headache for 2 days.  No fever or chills, cp, sob, vomiting, diarrhea, constipation, dysuria, hematuria.  No back or flank pain.  No hx of abdominal or pelvic surgery.      Patient Active Problem List   Diagnosis    Overweight (BMI 25.0-29.9)    Anxiety    Mild major depression (H)    Major depression, recurrent (H)    Class 3 severe obesity due to excess calories without serious comorbidity with body mass index (BMI) of 40.0 to 44.9 in adult (H)    Gastroesophageal reflux disease, unspecified whether esophagitis present       Current Outpatient Medications   Medication Sig Dispense Refill    cefpodoxime (VANTIN) 200 MG tablet Take 1 tablet (200 mg) by mouth 2 times daily for 7 days. 14 tablet 0    ondansetron (ZOFRAN ODT) 4 MG ODT  tab Take 1 tablet (4 mg) by mouth every 8 hours as needed for nausea. 20 tablet 0    levonorgestrel (MIRENA) 20 MCG/24HR IUD 1 each (20 mcg) by Intrauterine route once for 1 dose 1 each 0    Vitamin D, Cholecalciferol, 25 MCG (1000 UT) TABS Take 1,000 Int'l Units by mouth every other day (Patient not taking: Reported on 11/23/2024)       No current facility-administered medications for this visit.       Past Medical History:   Diagnosis Date    Pertussis 2016       Social History   reports that she has never smoked. She has never used smokeless tobacco. She reports current alcohol use. She reports that she does not use drugs.    Family History   Problem Relation Age of Onset    Melanoma Mother     Obesity Mother     Cancer Father         testicular     Hyperlipidemia Father     Depression Father     Asthma Father     Obesity Father     Diabetes Type 2  Father     Lung Cancer Paternal Grandmother     Leukemia Maternal Aunt         in her 40s    Diabetes Maternal Aunt     Cancer Paternal Aunt         [place]     Leukemia Paternal Aunt         College age    Heart Disease Paternal Grandfather     Macular Degeneration Paternal Grandfather     Glaucoma Maternal Grandmother     Brain Tumor Maternal Grandfather     Diabetes Other     Diabetes Cousin     Other Cancer Other         Leukemia    Other Cancer Other     Depression Brother     Breast Cancer No family hx of     Colon Cancer No family hx of     Retinal detachment No family hx of        Review of Systems  Constitutional, HEENT, cardiovascular, pulmonary, GI, , musculoskeletal, neuro, skin, endocrine and psych systems are negative, except as otherwise noted.      Objective    BP (!) 150/86   Pulse 85   Temp 99  F (37.2  C) (Tympanic)   Resp 20   Wt 112.5 kg (248 lb)   SpO2 96%   BMI 39.62 kg/m    Physical Exam   GENERAL: alert and no distress  EYES: Eyes grossly normal to inspection, PERRL and conjunctivae and sclerae normal  HENT: ear canals and TM's normal,  nose and mouth without ulcers or lesions  NECK: no adenopathy, no asymmetry, masses, or scars  RESP: lungs clear to auscultation - no rales, rhonchi or wheezes  CV: regular rate and rhythm, normal S1 S2, no S3 or S4, no murmur, click or rub, no peripheral edema  ABDOMEN: soft, mild ttp diffusely, no guarding no rebound, no flank ttp, no cva ttp, no hepatosplenomegaly, no masses and bowel sounds normal  MS: no gross musculoskeletal defects noted, no edema  SKIN: no suspicious lesions or rashes  NEURO: Normal strength and tone, mentation intact and speech normal  PSYCH: mentation appears normal, affect normal/bright    Results for orders placed or performed in visit on 11/23/24   XR Abdomen 2 Views     Status: None    Narrative    EXAM: XR ABDOMEN 2 VIEWS  LOCATION: [facility]  DATE: 11/23/2024    INDICATION: nausea, luq abdominal pain  COMPARISON: None.      Impression    IMPRESSION: Bowel gas pattern is normal. Nothing for obstruction. No free air. Tiny vascular appearing calcification left side of the pelvis. Intrauterine device in expected position.   Results for orders placed or performed in visit on 11/23/24   UA Macroscopic with reflex to Microscopic and Culture - Clinic Collect     Status: Abnormal    Specimen: Urine, Clean Catch   Result Value Ref Range    Color Urine Yellow Colorless, Straw, Light Yellow, Yellow    Appearance Urine Clear Clear    Glucose Urine Negative Negative mg/dL    Bilirubin Urine Negative Negative    Ketones Urine >=160 (A) Negative mg/dL    Specific Gravity Urine 1.020 1.003 - 1.035    Blood Urine Trace (A) Negative    pH Urine 6.0 5.0 - 7.0    Protein Albumin Urine Negative Negative mg/dL    Urobilinogen Urine 0.2 0.2, 1.0 E.U./dL    Nitrite Urine Negative Negative    Leukocyte Esterase Urine Small (A) Negative   HCG qualitative urine     Status: Normal   Result Value Ref Range    hCG Urine Qualitative Negative Negative   UA Microscopic with Reflex to Culture      Status: Abnormal   Result Value Ref Range    Bacteria Urine Few (A) None Seen /HPF    RBC Urine None Seen 0-2 /HPF /HPF    WBC Urine 5-10 (A) 0-5 /HPF /HPF    Squamous Epithelials Urine Few (A) None Seen /LPF    Narrative    Urine Culture not indicated   Comprehensive metabolic panel     Status: Abnormal   Result Value Ref Range    Sodium 140 135 - 145 mmol/L    Potassium 4.4 3.4 - 5.3 mmol/L    Carbon Dioxide (CO2) 24 22 - 29 mmol/L    Anion Gap 11 7 - 15 mmol/L    Urea Nitrogen 11.3 6.0 - 20.0 mg/dL    Creatinine 0.85 0.51 - 0.95 mg/dL    GFR Estimate >90 >60 mL/min/1.73m2    Calcium 9.8 8.8 - 10.4 mg/dL    Chloride 105 98 - 107 mmol/L    Glucose 106 (H) 70 - 99 mg/dL    Alkaline Phosphatase 68 40 - 150 U/L    AST 23 0 - 45 U/L    ALT 16 0 - 50 U/L    Protein Total 7.8 6.4 - 8.3 g/dL    Albumin 4.5 3.5 - 5.2 g/dL    Bilirubin Total 0.5 <=1.2 mg/dL   Lipase     Status: Normal   Result Value Ref Range    Lipase 13 13 - 60 U/L   CBC with platelets and differential     Status: None   Result Value Ref Range    WBC Count 10.7 4.0 - 11.0 10e3/uL    RBC Count 4.90 3.80 - 5.20 10e6/uL    Hemoglobin 14.3 11.7 - 15.7 g/dL    Hematocrit 42.3 35.0 - 47.0 %    MCV 86 78 - 100 fL    MCH 29.2 26.5 - 33.0 pg    MCHC 33.8 31.5 - 36.5 g/dL    RDW 12.4 10.0 - 15.0 %    Platelet Count 422 150 - 450 10e3/uL    % Neutrophils 77 %    % Lymphocytes 16 %    % Monocytes 5 %    % Eosinophils 1 %    % Basophils 1 %    % Immature Granulocytes 0 %    Absolute Neutrophils 8.2 1.6 - 8.3 10e3/uL    Absolute Lymphocytes 1.7 0.8 - 5.3 10e3/uL    Absolute Monocytes 0.6 0.0 - 1.3 10e3/uL    Absolute Eosinophils 0.2 0.0 - 0.7 10e3/uL    Absolute Basophils 0.1 0.0 - 0.2 10e3/uL    Absolute Immature Granulocytes 0.0 <=0.4 10e3/uL   CBC with platelets and differential     Status: None    Narrative    The following orders were created for panel order CBC with platelets and differential.  Procedure                               Abnormality         Status                      ---------                               -----------         ------                     CBC with platelets and d...[[id number]]                      Final result                 Please view results for these tests on the individual orders.

## 2024-11-24 LAB — BACTERIA UR CULT: NORMAL

## 2024-12-01 ASSESSMENT — PATIENT HEALTH QUESTIONNAIRE - PHQ9
SUM OF ALL RESPONSES TO PHQ QUESTIONS 1-9: 6
10. IF YOU CHECKED OFF ANY PROBLEMS, HOW DIFFICULT HAVE THESE PROBLEMS MADE IT FOR YOU TO DO YOUR WORK, TAKE CARE OF THINGS AT HOME, OR GET ALONG WITH OTHER PEOPLE: SOMEWHAT DIFFICULT
SUM OF ALL RESPONSES TO PHQ QUESTIONS 1-9: 6

## 2024-12-02 ENCOUNTER — OFFICE VISIT (OUTPATIENT)
Dept: INTERNAL MEDICINE | Facility: CLINIC | Age: 24
End: 2024-12-02
Payer: COMMERCIAL

## 2024-12-02 VITALS
BODY MASS INDEX: 39.73 KG/M2 | TEMPERATURE: 98.1 F | OXYGEN SATURATION: 99 % | DIASTOLIC BLOOD PRESSURE: 84 MMHG | HEART RATE: 81 BPM | SYSTOLIC BLOOD PRESSURE: 125 MMHG | WEIGHT: 247.2 LBS | HEIGHT: 66 IN

## 2024-12-02 DIAGNOSIS — R11.0 NAUSEA: ICD-10-CM

## 2024-12-02 DIAGNOSIS — R10.13 DYSPEPSIA: Primary | ICD-10-CM

## 2024-12-02 DIAGNOSIS — R10.12 LUQ ABDOMINAL PAIN: ICD-10-CM

## 2024-12-02 PROCEDURE — 99213 OFFICE O/P EST LOW 20 MIN: CPT | Performed by: PHYSICIAN ASSISTANT

## 2024-12-02 RX ORDER — FAMOTIDINE 20 MG/1
20 TABLET, FILM COATED ORAL 2 TIMES DAILY
Qty: 60 TABLET | Refills: 0 | Status: SHIPPED | OUTPATIENT
Start: 2024-12-02

## 2024-12-02 NOTE — PROGRESS NOTES
"  Assessment & Plan     Dyspepsia    - famotidine (PEPCID) 20 MG tablet; Take 1 tablet (20 mg) by mouth 2 times daily.    LUQ abdominal pain      Nausea    Exam benign today. No pain on palpation   Continue with zofran as needed for nausea  Reviewed simple low fiber diet this week for bowel rest  Fluids  IF pain does develop, change in stool or worsening symptoms then contact PCP - recheck or advanced imaging if needed.                Brenton Lemus is a 23 year old, presenting for the following health issues:  Abdominal Pain  Zofran has helped some what  Went away for a week and came back Friday     History of Present Illness       Reason for visit:  Nausea and abdominal discomfort  Symptom onset:  1-2 weeks ago  Symptoms include:  Nausea, discomfort on the left side of abdomen  Symptom intensity:  Moderate  Symptom progression:  Staying the same  Had these symptoms before:  No  What makes it better:  Zofran has been helping with the nausea usually   She is taking medications regularly.     Patient seen in UC on 11/23/24  Labs and xray done  Labs all normal/ neg  She was empirically treated for UTI - culture was negative  Patient states nausea and symptoms improved the next day or so after seeing UC.  This weekend symptoms returned.                   Objective    /84   Pulse 81   Temp 98.1  F (36.7  C) (Temporal)   Ht 1.685 m (5' 6.34\")   Wt 112.1 kg (247 lb 3.2 oz)   SpO2 99%   BMI 39.49 kg/m    Body mass index is 39.49 kg/m .  Physical Exam   GENERAL: alert and no distress  ABDOMEN: soft, nontender, no hepatosplenomegaly, no masses and bowel sounds normal  MS: no gross musculoskeletal defects noted, no edema    Office Visit on 11/23/2024   Component Date Value Ref Range Status    Color Urine 11/23/2024 Yellow  Colorless, Straw, Light Yellow, Yellow Final    Appearance Urine 11/23/2024 Clear  Clear Final    Glucose Urine 11/23/2024 Negative  Negative mg/dL Final    Bilirubin Urine 11/23/2024 " Negative  Negative Final    Ketones Urine 11/23/2024 >=160 (A)  Negative mg/dL Final    Specific Gravity Urine 11/23/2024 1.020  1.003 - 1.035 Final    Blood Urine 11/23/2024 Trace (A)  Negative Final    pH Urine 11/23/2024 6.0  5.0 - 7.0 Final    Protein Albumin Urine 11/23/2024 Negative  Negative mg/dL Final    Urobilinogen Urine 11/23/2024 0.2  0.2, 1.0 E.U./dL Final    Nitrite Urine 11/23/2024 Negative  Negative Final    Leukocyte Esterase Urine 11/23/2024 Small (A)  Negative Final    hCG Urine Qualitative 11/23/2024 Negative  Negative Final    This test is for screening purposes.  Results should be interpreted along with the clinical picture.  Confirmation testing is available if warranted by ordering [id number], HCG Quantitative Pregnancy.    Bacteria Urine 11/23/2024 Few (A)  None Seen /HPF Final    RBC Urine 11/23/2024 None Seen  0-2 /HPF /HPF Final    WBC Urine 11/23/2024 5-10 (A)  0-5 /HPF /HPF Final    Squamous Epithelials Urine 11/23/2024 Few (A)  None Seen /LPF Final    Sodium 11/23/2024 140  135 - 145 mmol/L Final    Potassium 11/23/2024 4.4  3.4 - 5.3 mmol/L Final    Carbon Dioxide (CO2) 11/23/2024 24  22 - 29 mmol/L Final    Anion Gap 11/23/2024 11  7 - 15 mmol/L Final    Urea Nitrogen 11/23/2024 11.3  6.0 - 20.0 mg/dL Final    Creatinine 11/23/2024 0.85  0.51 - 0.95 mg/dL Final    GFR Estimate 11/23/2024 >90  >60 mL/min/1.73m2 Final    eGFR calculated using 2021 CKD-EPI equation.    Calcium 11/23/2024 9.8  8.8 - 10.4 mg/dL Final    Reference intervals for this test were updated on 7/16/2024 to reflect our healthy population more accurately. There may be differences in the flagging of prior results with similar values performed with this method. Those prior results can be interpreted in the context of the updated reference intervals.    Chloride 11/23/2024 105  98 - 107 mmol/L Final    Glucose 11/23/2024 106 (H)  70 - 99 mg/dL Final    Alkaline Phosphatase 11/23/2024 68  40 - 150 U/L Final    AST  11/23/2024 23  0 - 45 U/L Final    ALT 11/23/2024 16  0 - 50 U/L Final    Protein Total 11/23/2024 7.8  6.4 - 8.3 g/dL Final    Albumin 11/23/2024 4.5  3.5 - 5.2 g/dL Final    Bilirubin Total 11/23/2024 0.5  <=1.2 mg/dL Final    Lipase 11/23/2024 13  13 - 60 U/L Final    WBC Count 11/23/2024 10.7  4.0 - 11.0 10e3/uL Final    RBC Count 11/23/2024 4.90  3.80 - 5.20 10e6/uL Final    Hemoglobin 11/23/2024 14.3  11.7 - 15.7 g/dL Final    Hematocrit 11/23/2024 42.3  35.0 - 47.0 % Final    MCV 11/23/2024 86  78 - 100 fL Final    MCH 11/23/2024 29.2  26.5 - 33.0 pg Final    MCHC 11/23/2024 33.8  31.5 - 36.5 g/dL Final    RDW 11/23/2024 12.4  10.0 - 15.0 % Final    Platelet Count 11/23/2024 422  150 - 450 10e3/uL Final    % Neutrophils 11/23/2024 77  % Final    % Lymphocytes 11/23/2024 16  % Final    % Monocytes 11/23/2024 5  % Final    % Eosinophils 11/23/2024 1  % Final    % Basophils 11/23/2024 1  % Final    % Immature Granulocytes 11/23/2024 0  % Final    Absolute Neutrophils 11/23/2024 8.2  1.6 - 8.3 10e3/uL Final    Absolute Lymphocytes 11/23/2024 1.7  0.8 - 5.3 10e3/uL Final    Absolute Monocytes 11/23/2024 0.6  0.0 - 1.3 10e3/uL Final    Absolute Eosinophils 11/23/2024 0.2  0.0 - 0.7 10e3/uL Final    Absolute Basophils 11/23/2024 0.1  0.0 - 0.2 10e3/uL Final    Absolute Immature Granulocytes 11/23/2024 0.0  <=0.4 10e3/uL Final    Culture 11/23/2024 <10,000 CFU/mL Mixture of Urogenital [place]   Final           Signed Electronically by: Lien Vaughan PA-C

## 2025-01-20 ENCOUNTER — MYC MEDICAL ADVICE (OUTPATIENT)
Dept: PEDIATRICS | Facility: CLINIC | Age: 25
End: 2025-01-20
Payer: COMMERCIAL

## 2025-01-21 ENCOUNTER — OFFICE VISIT (OUTPATIENT)
Dept: PEDIATRICS | Facility: CLINIC | Age: 25
End: 2025-01-21
Payer: COMMERCIAL

## 2025-01-21 ENCOUNTER — NURSE TRIAGE (OUTPATIENT)
Dept: FAMILY MEDICINE | Facility: CLINIC | Age: 25
End: 2025-01-21

## 2025-01-21 VITALS
TEMPERATURE: 98.3 F | BODY MASS INDEX: 40.56 KG/M2 | SYSTOLIC BLOOD PRESSURE: 136 MMHG | DIASTOLIC BLOOD PRESSURE: 84 MMHG | RESPIRATION RATE: 16 BRPM | HEART RATE: 91 BPM | HEIGHT: 66 IN | WEIGHT: 252.4 LBS | OXYGEN SATURATION: 99 %

## 2025-01-21 DIAGNOSIS — R10.12 ABDOMINAL PAIN, LEFT UPPER QUADRANT: Primary | ICD-10-CM

## 2025-01-21 DIAGNOSIS — K21.9 GASTROESOPHAGEAL REFLUX DISEASE, UNSPECIFIED WHETHER ESOPHAGITIS PRESENT: ICD-10-CM

## 2025-01-21 DIAGNOSIS — R82.4 KETONURIA: ICD-10-CM

## 2025-01-21 PROBLEM — F33.9 MAJOR DEPRESSION, RECURRENT: Status: ACTIVE | Noted: 2020-08-01

## 2025-01-21 PROBLEM — E66.3 OVERWEIGHT (BMI 25.0-29.9): Status: RESOLVED | Noted: 2018-08-06 | Resolved: 2025-01-21

## 2025-01-21 LAB
ALBUMIN UR-MCNC: NEGATIVE MG/DL
APPEARANCE UR: CLEAR
BACTERIA #/AREA URNS HPF: NORMAL /HPF
BILIRUB UR QL STRIP: NEGATIVE
COLOR UR AUTO: YELLOW
GLUCOSE UR STRIP-MCNC: NEGATIVE MG/DL
HGB UR QL STRIP: NEGATIVE
KETONES UR STRIP-MCNC: NEGATIVE MG/DL
LEUKOCYTE ESTERASE UR QL STRIP: NEGATIVE
NITRATE UR QL: NEGATIVE
PH UR STRIP: 6 [PH] (ref 5–7)
RBC #/AREA URNS AUTO: NORMAL /HPF
SP GR UR STRIP: 1.02 (ref 1–1.03)
SQUAMOUS #/AREA URNS AUTO: NORMAL /LPF
UROBILINOGEN UR STRIP-ACNC: 0.2 E.U./DL
WBC #/AREA URNS AUTO: NORMAL /HPF

## 2025-01-21 PROCEDURE — 81001 URINALYSIS AUTO W/SCOPE: CPT | Performed by: INTERNAL MEDICINE

## 2025-01-21 PROCEDURE — 99214 OFFICE O/P EST MOD 30 MIN: CPT | Performed by: INTERNAL MEDICINE

## 2025-01-21 PROCEDURE — 80048 BASIC METABOLIC PNL TOTAL CA: CPT | Performed by: INTERNAL MEDICINE

## 2025-01-21 PROCEDURE — G2211 COMPLEX E/M VISIT ADD ON: HCPCS | Performed by: INTERNAL MEDICINE

## 2025-01-21 PROCEDURE — 36415 COLL VENOUS BLD VENIPUNCTURE: CPT | Performed by: INTERNAL MEDICINE

## 2025-01-21 ASSESSMENT — PAIN SCALES - GENERAL: PAINLEVEL_OUTOF10: MILD PAIN (2)

## 2025-01-21 ASSESSMENT — PATIENT HEALTH QUESTIONNAIRE - PHQ9
SUM OF ALL RESPONSES TO PHQ QUESTIONS 1-9: 9
10. IF YOU CHECKED OFF ANY PROBLEMS, HOW DIFFICULT HAVE THESE PROBLEMS MADE IT FOR YOU TO DO YOUR WORK, TAKE CARE OF THINGS AT HOME, OR GET ALONG WITH OTHER PEOPLE: SOMEWHAT DIFFICULT
SUM OF ALL RESPONSES TO PHQ QUESTIONS 1-9: 9

## 2025-01-21 NOTE — TELEPHONE ENCOUNTER
"RN attempted to reach patient. Left voicemail to call back and speak with a nurse.     When patient calls back-   -Triage abdominal pain to ensure no worsening, schedule appt as needed. Patient has not been evaluated by PCP or partner for this.        12/2/24 visit with Lien Vaughan ([facility]):  \"Nausea  Exam benign today. No pain on palpation   Continue with zofran as needed for nausea  Reviewed simple low fiber diet this week for bowel rest  Fluids  IF pain does develop, change in stool or worsening symptoms then contact PCP - recheck or advanced imaging if needed.\"        Rhianna Figueroa RN    "

## 2025-01-21 NOTE — PATIENT INSTRUCTIONS
Start the omeprazole 30-60 mins before you eat.  It could take 2 weeks to feel better on this.  If you get better, then I recommend staying on it for the month and then tapering to every other day the next month.  If you don't get better with this and the ultrasound is normal, then we will figure out next steps.

## 2025-01-21 NOTE — TELEPHONE ENCOUNTER
Mild abdominal discomfort generalized - onset 11/21/2024   Occasional diarrhea on and off   Sometimes feeling subsides after BM     RN scheduled as below   Appointments in Next Year      Jan 21, 2025 1:30 PM  (Arrive by 1:10 PM)  Provider Visit with Marcelle Murrell MD  [facility] ([facility] ) [phone]          Reason for Disposition   MILD pain (e.g., does not interfere with normal activities) and pain comes and goes (cramps) lasts > 48 hours  (Exception: This same abdominal pain is a chronic symptom recurrent or ongoing AND present > 4 weeks.)    Additional Information   Negative: Passed out (e.g., fainted, lost consciousness, blacked out and was not responding)   Negative: Shock suspected (e.g., cold/pale/clammy skin, too weak to stand, low BP, rapid pulse)   Negative: Sounds like a life-threatening emergency to the triager   Negative: Followed an abdomen (stomach) injury   Negative: Chest pain   Negative: Abdominal pain and pregnant < 20 weeks   Negative: Abdominal pain and pregnant 20 or more weeks   Negative: Pain is mainly in upper abdomen (if needed ask: 'is it mainly above the belly button?')   Negative: Abdomen bloating or swelling are main symptoms   Negative: SEVERE abdominal pain (e.g., excruciating)   Negative: Vomiting red blood or black (coffee ground) material   Negative: Blood in bowel movements  (Exception: Blood on surface of BM with constipation.)   Negative: Black or tarry bowel movements  (Exception: Chronic-unchanged black-grey BMs AND is taking iron pills or Pepto-Bismol.)   Negative: MILD TO MODERATE constant pain lasting > 2 hours   Negative: MILD TO MODERATE constant pain lasting > 2 hours, and age > 60 years   Negative: Vomiting bile (green color)   Negative: Patient sounds very sick or weak to the triager   Negative: Vomiting and abdomen looks much more swollen than usual   Negative: White of the eyes have turned yellow (i.e., jaundice)    "Negative: Blood in urine (red, pink, or tea-colored)   Negative: Fever > 103 F (39.4 C)   Negative: Fever > 101 F (38.3 C) and over 60 years of age   Negative: Fever > 100 F (37.8 C) and has diabetes mellitus or a weak immune system (e.g., HIV positive, cancer chemotherapy, organ transplant, splenectomy, chronic steroids)   Negative: Fever > 100 F (37.8 C) and bedridden (e.g., CVA, chronic illness, recovering from surgery)   Negative: MODERATE pain (e.g., interferes with normal activities that comes and goes (cramps) lasts > 24 hours  (Exception: Pain with Vomiting or Diarrhea - see that Protocol.)   Negative: Unusual vaginal discharge   Negative: Pregnancy suspected (e.g., missed last menstrual period)   Negative: Patient wants to be seen    Answer Assessment - Initial Assessment Questions  1. LOCATION: \"Where does it hurt?\"       Feels like on left side, but sometimes moves around to other parts of the abdomen   2. RADIATION: \"Does the pain shoot anywhere else?\" (e.g., chest, back)      Lower back aching   3. ONSET: \"When did the pain begin?\" (e.g., minutes, hours or days ago)       Was seen in urgent care approx 2 months ago   Symptoms started 11/21/2024   4. SUDDEN: \"Gradual or sudden onset?\"      Sudden onset 11/21/2024, had small amount of vaginal spotting at this time which has resolved   5. PATTERN \"Does the pain come and go, or is it constant?\"      Always there   6. SEVERITY: \"How bad is the pain?\"  (e.g., Scale 1-10; mild, moderate, or severe)      3 on pain scale - feels uncomfortable   7. RECURRENT SYMPTOM: \"Have you ever had this type of stomach pain before?\" If Yes, ask: \"When was the last time?\" and \"What happened that time?\"       No   8. CAUSE: \"What do you think is causing the stomach pain?\"      Possibly some type of inflammation, feels full   9. RELIEVING/AGGRAVATING FACTORS: \"What makes it better or worse?\" (e.g., antacids, bending or twisting motion, bowel movement)      Occasionally a BM " "helps dicomfort but does not go away completely   10. OTHER SYMPTOMS: \"Do you have any other symptoms?\" (e.g., back pain, diarrhea, fever, urination pain, vomiting)        Nausea upon onset - this has now subsided   Occasional diarrhea on and off   11. PREGNANCY: \"Is there any chance you are pregnant?\" \"When was your last menstrual period?\"        No    Protocols used: Abdominal Pain - Female-A-[place]    Daniela Ryan, Registered Nurse  [facility]     "

## 2025-01-21 NOTE — PROGRESS NOTES
"  Assessment & Plan     Abdominal pain, left upper quadrant  Discussed possible causes including gastritis, colitis, pancreatitis.  Recent labs make most possibilities less likely.  Start by increase in antacid therapy with PPI and check pelvic US.  If not improved, CT vs GI consult.  - US Pelvic Complete with Transvaginal; Future  - omeprazole (PRILOSEC) 20 MG DR capsule; Take 1 capsule (20 mg) by mouth daily.    Gastroesophageal reflux disease, unspecified whether esophagitis present  Change to PPI  - omeprazole (PRILOSEC) 20 MG DR capsule; Take 1 capsule (20 mg) by mouth daily.    Ketonuria  Recheck.    - UA with Microscopic reflex to Culture - lab collect; Future  - Basic metabolic panel  (Ca, Cl, CO2, Creat, Gluc, K, Na, BUN); Future  - UA with Microscopic reflex to Culture - lab collect  - Basic metabolic panel  (Ca, Cl, CO2, Creat, Gluc, K, Na, BUN)  - UA Microscopic with Reflex to Culture            See Patient Instructions    Brenton Lemus is a 24 year old, presenting for the following health issues:  Abdominal Pain        1/21/2025     1:17 PM   Additional Questions   Roomed by    Accompanied by self     History of Present Illness       Reason for visit:  Abdominal discomfort  Symptom onset:  More than a month  Symptoms include:  Abdominal pain and discomfort  Symptom intensity:  Moderate  Symptom progression:  Staying the same  Had these symptoms before:  No  What makes it worse:  Unsure  What makes it better:  Sometimes having a bowel movement helps a little bit   She is taking medications regularly.   From triage note:  LOCATION: \"Where does it hurt?\"   Feels like on left side, but sometimes moves around to other parts of the abdomen   2. RADIATION: \"Does the pain shoot anywhere else?\" (e.g., chest, back)  Lower back aching   3. ONSET: \"When did the pain begin?\" (e.g., minutes, hours or days ago)   Was seen in urgent care approx 2 months ago   Symptoms started 11/21/2024   4. SUDDEN: \"Gradual or " "sudden onset?\"  Sudden onset 11/21/2024, had small amount of vaginal spotting at this time which has resolved   5. PATTERN \"Does the pain come and go, or is it constant?\"  Always there   6. SEVERITY: \"How bad is the pain?\" (e.g., Scale 1-10; mild, moderate, or severe)  3 on pain scale - feels uncomfortable   7. RECURRENT SYMPTOM: \"Have you ever had this type of stomach pain before?\" If Yes, ask: \"When was the last time?\" and \"What happened that time?\"   No   8. CAUSE: \"What do you think is causing the stomach pain?\"  Possibly some type of inflammation, feels full   9. RELIEVING/AGGRAVATING FACTORS: \"What makes it better or worse?\" (e.g., antacids, bending or twisting motion, bowel movement)  Occasionally a BM helps dicomfort but does not go away completely   10. OTHER SYMPTOMS: \"Do you have any other symptoms?\" (e.g., back pain, diarrhea, fever, urination pain, vomiting)  Nausea upon onset - this has now subsided   Occasional diarrhea on and off   11. PREGNANCY: \"Is there any chance you are pregnant?\" \"When was your last menstrual period?\"  No     Started two months ago.  Had some vaginal discharge at onset.  Went to urgent care.  Thought UTI and on antibiotic and things got better.  Got worse again and went back to UC and told to try low fiber diet.  Labs normal.  Has almost constantly but varies in intensity.  Had ketones in urine in November.  Not on a special diet but had loss of appetite.  Reports a couple of drinks of alcohol a week.  BMs irregular.  Before was regular but a couple a day.  Had diarrhea at the onset of illness and then better and now some today too.  No constipation.  No blood in stools.  Has an IOD so doesn't get periods.  Taking famotidine as needed and doesn't help.  Does usually have GERD and already took it as needed.  Took zofran for 1-2 weeks but now better.  Not taking any herbal supplements.  Takes ibuprofen or tylenol as needed but not regularly at all.              Objective    BP " "136/84   Pulse 91   Temp 98.3  F (36.8  C)   Resp 16   Ht 1.68 m (5' 6.14\")   Wt 114.5 kg (252 lb 6.4 oz)   SpO2 99%   BMI 40.56 kg/m    Body mass index is 40.56 kg/m .  Physical Exam   GENERAL: alert and no distress  NECK: no adenopathy, no asymmetry, masses, or scars  RESP: lungs clear to auscultation - no rales, rhonchi or wheezes  CV: regular rate and rhythm, normal S1 S2, no S3 or S4, no murmur, click or rub, no peripheral edema  ABDOMEN: tenderness LUQ to left lateral, no organomegaly or masses, liver span normal to percussion, and bowel sounds normal  MS: no gross musculoskeletal defects noted, no edema  SKIN: no suspicious lesions or rashes          The longitudinal plan of care for the diagnosis(es)/condition(s) as documented were addressed during this visit. Due to the added complexity in care, I will continue to support Mariah in the subsequent management and with ongoing continuity of care.    Signed Electronically by: Marcelle Murrell MD    "

## 2025-01-22 LAB
ANION GAP SERPL CALCULATED.3IONS-SCNC: 11 MMOL/L (ref 7–15)
BUN SERPL-MCNC: 12 MG/DL (ref 6–20)
CALCIUM SERPL-MCNC: 9.6 MG/DL (ref 8.8–10.4)
CHLORIDE SERPL-SCNC: 105 MMOL/L (ref 98–107)
CREAT SERPL-MCNC: 0.75 MG/DL (ref 0.51–0.95)
EGFRCR SERPLBLD CKD-EPI 2021: >90 ML/MIN/1.73M2
GLUCOSE SERPL-MCNC: 87 MG/DL (ref 70–99)
HCO3 SERPL-SCNC: 22 MMOL/L (ref 22–29)
POTASSIUM SERPL-SCNC: 4.4 MMOL/L (ref 3.4–5.3)
SODIUM SERPL-SCNC: 138 MMOL/L (ref 135–145)

## 2025-02-10 ENCOUNTER — MYC MEDICAL ADVICE (OUTPATIENT)
Dept: PEDIATRICS | Facility: CLINIC | Age: 25
End: 2025-02-10
Payer: COMMERCIAL

## 2025-02-10 DIAGNOSIS — R10.12 ABDOMINAL PAIN, LEFT UPPER QUADRANT: Primary | ICD-10-CM

## 2025-02-10 NOTE — TELEPHONE ENCOUNTER
Dr. Murrell- pt reporting she has been taking omeprazole for a couple weeks and no change in sx.   - wanting to know next steps.     Please advise.  Mindi Dela Cruz RN     OV note 1/21:  Abdominal pain, left upper quadrant  Discussed possible causes including gastritis, colitis, pancreatitis.  Recent labs make most possibilities less likely.  Start by increase in antacid therapy with PPI and check pelvic US.  If not improved, CT vs GI consult.  - US Pelvic Complete with Transvaginal; Future  - omeprazole (PRILOSEC) 20 MG DR capsule; Take 1 capsule (20 mg) by mouth daily.

## 2025-02-12 ENCOUNTER — MYC MEDICAL ADVICE (OUTPATIENT)
Dept: PEDIATRICS | Facility: CLINIC | Age: 25
End: 2025-02-12
Payer: COMMERCIAL

## 2025-02-12 ENCOUNTER — ANCILLARY PROCEDURE (OUTPATIENT)
Dept: ULTRASOUND IMAGING | Facility: CLINIC | Age: 25
End: 2025-02-12
Attending: INTERNAL MEDICINE
Payer: COMMERCIAL

## 2025-02-12 DIAGNOSIS — R10.12 ABDOMINAL PAIN, LEFT UPPER QUADRANT: ICD-10-CM

## 2025-02-12 DIAGNOSIS — K21.9 GASTROESOPHAGEAL REFLUX DISEASE, UNSPECIFIED WHETHER ESOPHAGITIS PRESENT: ICD-10-CM

## 2025-02-12 DIAGNOSIS — R10.12 ABDOMINAL PAIN, LEFT UPPER QUADRANT: Primary | ICD-10-CM

## 2025-02-12 PROBLEM — K76.0 METABOLIC DYSFUNCTION-ASSOCIATED STEATOTIC LIVER DISEASE (MASLD): Status: ACTIVE | Noted: 2025-02-12

## 2025-02-12 PROCEDURE — 76700 US EXAM ABDOM COMPLETE: CPT

## 2025-02-12 RX ORDER — OMEPRAZOLE 20 MG/1
20 CAPSULE, DELAYED RELEASE ORAL DAILY
Qty: 90 CAPSULE | Refills: 1 | OUTPATIENT
Start: 2025-02-12

## 2025-02-13 NOTE — TELEPHONE ENCOUNTER
"Dr Murrell,     Please review patient response to result note on 2/12 abdominal ultrasound:    - Patient should have another refill of omeprazole; pt informed.   - Request GI referral, pended.     \"Dear Mariah,     Here are my comments about your recent results. The good news is that we don't see any major abnormalities or causes of your pain.    The hepatic steatosis is a metabolic-associated condition that can leave to liver damage but there is no evidence of that yet.  You should have labs checked regularly (every year or two).    The bad news is that I still don't have a reason for your pain.  How are you feeling?  We could continue to give the omeprazole more time or I can refer you to a gastroenterologist.  Let me know what you think.     Please let us know if you have any questions or concerns.     Marcelle Murrell MD\"    Rhianna PAIZ RN on 2/13/2025 at 9:57 AM       "

## 2025-02-18 ENCOUNTER — TRANSFERRED RECORDS (OUTPATIENT)
Dept: HEALTH INFORMATION MANAGEMENT | Facility: CLINIC | Age: 25
End: 2025-02-18
Payer: COMMERCIAL

## 2025-02-28 ENCOUNTER — HOSPITAL ENCOUNTER (OUTPATIENT)
Dept: CT IMAGING | Facility: CLINIC | Age: 25
Discharge: HOME | End: 2025-02-28
Payer: COMMERCIAL

## 2025-02-28 DIAGNOSIS — K59.00 CONSTIPATION, UNSPECIFIED CONSTIPATION TYPE: ICD-10-CM

## 2025-02-28 DIAGNOSIS — R10.12 LEFT UPPER QUADRANT PAIN: ICD-10-CM

## 2025-02-28 DIAGNOSIS — R11.0 NAUSEA: ICD-10-CM

## 2025-02-28 PROCEDURE — 74177 CT ABD & PELVIS W/CONTRAST: CPT

## 2025-02-28 PROCEDURE — 250N000009 HC RX 250

## 2025-02-28 PROCEDURE — 250N000011 HC RX IP 250 OP 636

## 2025-02-28 RX ORDER — IOPAMIDOL 755 MG/ML
123 INJECTION, SOLUTION INTRAVASCULAR ONCE
Status: COMPLETED | OUTPATIENT
Start: 2025-02-28 | End: 2025-02-28

## 2025-02-28 RX ADMIN — SODIUM CHLORIDE 75 ML: 9 INJECTION, SOLUTION INTRAVENOUS at 17:06

## 2025-02-28 RX ADMIN — IOPAMIDOL 123 ML: 755 INJECTION, SOLUTION INTRAVENOUS at 17:06

## 2025-03-07 ENCOUNTER — TRANSFERRED RECORDS (OUTPATIENT)
Dept: HEALTH INFORMATION MANAGEMENT | Facility: CLINIC | Age: 25
End: 2025-03-07
Payer: COMMERCIAL

## 2025-07-29 ENCOUNTER — PATIENT OUTREACH (OUTPATIENT)
Dept: CARE COORDINATION | Facility: CLINIC | Age: 25
End: 2025-07-29
Payer: COMMERCIAL

## 2025-08-10 ENCOUNTER — E-VISIT (OUTPATIENT)
Dept: URGENT CARE | Facility: CLINIC | Age: 25
End: 2025-08-10
Payer: COMMERCIAL

## 2025-08-10 DIAGNOSIS — U07.1 INFECTION DUE TO COVID-19 VIRUS VARIANT OF CONCERN: Primary | ICD-10-CM

## 2025-08-12 ENCOUNTER — PATIENT OUTREACH (OUTPATIENT)
Dept: CARE COORDINATION | Facility: CLINIC | Age: 25
End: 2025-08-12
Payer: COMMERCIAL